# Patient Record
Sex: FEMALE | Race: WHITE | Employment: FULL TIME | URBAN - METROPOLITAN AREA
[De-identification: names, ages, dates, MRNs, and addresses within clinical notes are randomized per-mention and may not be internally consistent; named-entity substitution may affect disease eponyms.]

---

## 2017-03-29 ENCOUNTER — TRANSFERRED RECORDS (OUTPATIENT)
Dept: HEALTH INFORMATION MANAGEMENT | Facility: CLINIC | Age: 37
End: 2017-03-29

## 2017-03-29 LAB
HPV ABSTRACT: NORMAL
PAP-ABSTRACT: NORMAL

## 2018-09-25 ENCOUNTER — OFFICE VISIT (OUTPATIENT)
Dept: PEDIATRICS | Facility: CLINIC | Age: 38
End: 2018-09-25
Payer: COMMERCIAL

## 2018-09-25 VITALS
WEIGHT: 199.5 LBS | HEART RATE: 78 BPM | SYSTOLIC BLOOD PRESSURE: 102 MMHG | OXYGEN SATURATION: 98 % | TEMPERATURE: 97.4 F | HEIGHT: 64 IN | BODY MASS INDEX: 34.06 KG/M2 | DIASTOLIC BLOOD PRESSURE: 60 MMHG

## 2018-09-25 DIAGNOSIS — E03.9 HYPOTHYROIDISM, UNSPECIFIED TYPE: ICD-10-CM

## 2018-09-25 DIAGNOSIS — F32.1 MODERATE MAJOR DEPRESSION (H): ICD-10-CM

## 2018-09-25 DIAGNOSIS — K21.9 GASTROESOPHAGEAL REFLUX DISEASE, ESOPHAGITIS PRESENCE NOT SPECIFIED: ICD-10-CM

## 2018-09-25 DIAGNOSIS — Z23 NEED FOR PROPHYLACTIC VACCINATION AND INOCULATION AGAINST INFLUENZA: Primary | ICD-10-CM

## 2018-09-25 DIAGNOSIS — J30.2 CHRONIC SEASONAL ALLERGIC RHINITIS, UNSPECIFIED TRIGGER: ICD-10-CM

## 2018-09-25 DIAGNOSIS — J45.40 MODERATE PERSISTENT ASTHMA, UNSPECIFIED WHETHER COMPLICATED: ICD-10-CM

## 2018-09-25 DIAGNOSIS — F41.9 ANXIETY: ICD-10-CM

## 2018-09-25 DIAGNOSIS — Z86.32 HISTORY OF GESTATIONAL DIABETES: ICD-10-CM

## 2018-09-25 PROCEDURE — 90471 IMMUNIZATION ADMIN: CPT | Performed by: INTERNAL MEDICINE

## 2018-09-25 PROCEDURE — 90686 IIV4 VACC NO PRSV 0.5 ML IM: CPT | Performed by: INTERNAL MEDICINE

## 2018-09-25 PROCEDURE — 99204 OFFICE O/P NEW MOD 45 MIN: CPT | Mod: 25 | Performed by: INTERNAL MEDICINE

## 2018-09-25 RX ORDER — FLUTICASONE PROPIONATE 50 MCG
SPRAY, SUSPENSION (ML) NASAL
Qty: 1 BOTTLE | Refills: 11 | Status: SHIPPED | OUTPATIENT
Start: 2018-09-25

## 2018-09-25 RX ORDER — HYDROXYZINE PAMOATE 25 MG/1
CAPSULE ORAL
COMMUNITY
Start: 2018-03-14 | End: 2018-09-25

## 2018-09-25 RX ORDER — ALBUTEROL SULFATE 90 UG/1
2 AEROSOL, METERED RESPIRATORY (INHALATION) EVERY 6 HOURS PRN
Qty: 8.5 G | Refills: 11 | Status: SHIPPED | OUTPATIENT
Start: 2018-09-25 | End: 2020-04-20

## 2018-09-25 RX ORDER — CETIRIZINE HYDROCHLORIDE 10 MG/1
10 TABLET ORAL
COMMUNITY
Start: 2014-03-17

## 2018-09-25 RX ORDER — MONTELUKAST SODIUM 10 MG/1
10 TABLET ORAL AT BEDTIME
Qty: 90 TABLET | Refills: 3 | Status: SHIPPED | OUTPATIENT
Start: 2018-09-25 | End: 2019-11-06

## 2018-09-25 RX ORDER — LEVOTHYROXINE SODIUM 50 UG/1
TABLET ORAL
Qty: 120 TABLET | Refills: 3 | Status: SHIPPED | OUTPATIENT
Start: 2018-09-25 | End: 2020-02-19

## 2018-09-25 RX ORDER — MONTELUKAST SODIUM 10 MG/1
10 TABLET ORAL
COMMUNITY
Start: 2018-08-29 | End: 2018-09-25

## 2018-09-25 RX ORDER — BUDESONIDE AND FORMOTEROL FUMARATE DIHYDRATE 80; 4.5 UG/1; UG/1
2 AEROSOL RESPIRATORY (INHALATION) DAILY
Qty: 6.9 G | Refills: 11 | Status: SHIPPED | OUTPATIENT
Start: 2018-09-25 | End: 2019-06-11

## 2018-09-25 RX ORDER — HYDROXYZINE PAMOATE 25 MG/1
CAPSULE ORAL
Qty: 120 CAPSULE | Refills: 1 | Status: SHIPPED | OUTPATIENT
Start: 2018-09-25 | End: 2019-01-09

## 2018-09-25 RX ORDER — ALBUTEROL SULFATE 90 UG/1
2 AEROSOL, METERED RESPIRATORY (INHALATION)
COMMUNITY
Start: 2018-03-14 | End: 2018-09-25

## 2018-09-25 RX ORDER — LEVOTHYROXINE SODIUM 50 UG/1
TABLET ORAL
COMMUNITY
Start: 2018-08-29 | End: 2018-09-25

## 2018-09-25 RX ORDER — FLUTICASONE PROPIONATE 50 MCG
SPRAY, SUSPENSION (ML) NASAL
COMMUNITY
Start: 2018-03-14 | End: 2018-09-25

## 2018-09-25 RX ORDER — BUDESONIDE AND FORMOTEROL FUMARATE DIHYDRATE 80; 4.5 UG/1; UG/1
1 AEROSOL RESPIRATORY (INHALATION)
COMMUNITY
Start: 2018-03-14 | End: 2018-09-25

## 2018-09-25 ASSESSMENT — ANXIETY QUESTIONNAIRES
3. WORRYING TOO MUCH ABOUT DIFFERENT THINGS: SEVERAL DAYS
GAD7 TOTAL SCORE: 6
1. FEELING NERVOUS, ANXIOUS, OR ON EDGE: MORE THAN HALF THE DAYS
6. BECOMING EASILY ANNOYED OR IRRITABLE: NOT AT ALL
IF YOU CHECKED OFF ANY PROBLEMS ON THIS QUESTIONNAIRE, HOW DIFFICULT HAVE THESE PROBLEMS MADE IT FOR YOU TO DO YOUR WORK, TAKE CARE OF THINGS AT HOME, OR GET ALONG WITH OTHER PEOPLE: SOMEWHAT DIFFICULT
7. FEELING AFRAID AS IF SOMETHING AWFUL MIGHT HAPPEN: NOT AT ALL
5. BEING SO RESTLESS THAT IT IS HARD TO SIT STILL: SEVERAL DAYS
2. NOT BEING ABLE TO STOP OR CONTROL WORRYING: SEVERAL DAYS

## 2018-09-25 ASSESSMENT — PATIENT HEALTH QUESTIONNAIRE - PHQ9: 5. POOR APPETITE OR OVEREATING: SEVERAL DAYS

## 2018-09-25 NOTE — PROGRESS NOTES
SUBJECTIVE:   Carley Hoyt is a 38 year old female who presents to clinic today for the following health issues:    Pt is here today to establish care with Dr. Ospina    1. Allergies: Has a number of food, environmental allergies. Also allergic to pet dander. Takes Singulair for allergies and asthma. Also takes cetirizine.     2. Asthma: Uses daily inhaler for this, typically uses this 1 puff once daily. Uses albuterol inhaler prn. Reasonably well controlled at this time.     3. Hypothyroidism: Managed on levothyroxine, takes 50mcg during weekdays and 100mcg Sat and Sun. Last labs in April.     4. GERD: Takes omeprazole as needed. Previously was weaning off, now on a more scheduled basis.    5. Anxiety/depression: On sertraline, reasonably well controlled. Always feels tired. Takes before bed. Occasionally uses hydroxyzine during large gatherings.     Last pap 2016    Problem list and histories reviewed & adjusted, as indicated.  Additional history: as documented    Patient Active Problem List   Diagnosis     History of gestational diabetes     Hypothyroidism, unspecified type     Moderate persistent asthma, unspecified whether complicated     Chronic seasonal allergic rhinitis, unspecified trigger     Gastroesophageal reflux disease, esophagitis presence not specified     Moderate major depression (H)     Anxiety     Past Surgical History:   Procedure Laterality Date     APPENDECTOMY       CARPAL TUNNEL RELEASE RT/LT Right 2016      SECTION       wisdom teeth         Social History   Substance Use Topics     Smoking status: Former Smoker     Smokeless tobacco: Never Used     Alcohol use Yes      Comment: ocassionally      Family History   Problem Relation Age of Onset     Diabetes Father      Glaucoma Father      Diabetes Maternal Grandmother      Asthma Mother      Breast Cancer Mother 60           Reviewed and updated as needed this visit by clinical staff  Tobacco  Allergies  Meds  Med Hx   "Surg Hx  Fam Hx  Soc Hx      Reviewed and updated as needed this visit by Provider  Tobacco  Med Hx  Surg Hx  Fam Hx  Soc Hx        ROS:  Ten point review of systems performed and all others negative unless noted above.    OBJECTIVE:     /60 (BP Location: Right arm, Patient Position: Chair, Cuff Size: Adult Large)  Pulse 78  Temp 97.4  F (36.3  C) (Tympanic)  Ht 5' 4\" (1.626 m)  Wt 199 lb 8 oz (90.5 kg)  LMP 08/31/2018 (Exact Date)  SpO2 98%  Breastfeeding? No  BMI 34.24 kg/m2  Body mass index is 34.24 kg/(m^2).  GENERAL: healthy, alert and no distress  EYES: Eyes grossly normal to inspection, PERRL and conjunctivae and sclerae normal  HENT: ear canals and TM's normal, nose and mouth without ulcers or lesions  NECK: no adenopathy, no asymmetry, masses, or scars and thyroid normal to palpation  RESP: lungs clear to auscultation - no rales, rhonchi or wheezes  CV: regular rate and rhythm, normal S1 S2, no S3 or S4, no murmur, click or rub, no peripheral edema and peripheral pulses strong  MS: no gross musculoskeletal defects noted, no edema  SKIN: no suspicious lesions or rashes  NEURO: Normal strength and tone, mentation intact and speech normal      ASSESSMENT/PLAN:     1. Moderate persistent asthma, unspecified whether complicated  Well controlled per patient report, medications refilled.   - albuterol (PROAIR HFA/PROVENTIL HFA/VENTOLIN HFA) 108 (90 Base) MCG/ACT inhaler; Inhale 2 puffs into the lungs every 6 hours as needed for shortness of breath / dyspnea or wheezing  Dispense: 8.5 g; Refill: 11  - budesonide-formoterol (SYMBICORT) 80-4.5 MCG/ACT Inhaler; Inhale 2 puffs into the lungs daily  Dispense: 6.9 g; Refill: 11  - montelukast (SINGULAIR) 10 MG tablet; Take 1 tablet (10 mg) by mouth At Bedtime  Dispense: 90 tablet; Refill: 3    2. Chronic seasonal allergic rhinitis, unspecified trigger  Stable, medications refilled.   - fluticasone (FLONASE) 50 MCG/ACT spray; INSTILL 2 SPRAYS INTO BOTH " NOSTRILS ONCE A DAY, SHAKE WELL BEFORE USE  Dispense: 1 Bottle; Refill: 11  - montelukast (SINGULAIR) 10 MG tablet; Take 1 tablet (10 mg) by mouth At Bedtime  Dispense: 90 tablet; Refill: 3  - Olopatadine HCl (PAZEO) 0.7 % ophthalmic solution; Apply 1 drop to eye daily  Dispense: 2.5 mL; Refill: 3    3. Moderate major depression (H)  Symptoms relatively stable. Recently moved and has been under fair amount of stress with move, kids and starting new job. Medication refilled.   - sertraline (ZOLOFT) 50 MG tablet; Take 1 tablet (50 mg) by mouth daily  Dispense: 90 tablet; Refill: 3    4. Anxiety  Stable, medication refilled.   - hydrOXYzine (VISTARIL) 25 MG capsule; TAKE 1-2 CAPSULES AS NEEDED EVERY 6 HOURS FOR ANXIETY.  Dispense: 120 capsule; Refill: 1  - sertraline (ZOLOFT) 50 MG tablet; Take 1 tablet (50 mg) by mouth daily  Dispense: 90 tablet; Refill: 3    5. Hypothyroidism, unspecified type  Most recent labs wnl in April. Medication refilled.   - levothyroxine (SYNTHROID/LEVOTHROID) 50 MCG tablet; Take 2 tab on Sat and Sun and 1 tab on Monday through Friday.  Dispense: 120 tablet; Refill: 3    6. Gastroesophageal reflux disease, esophagitis presence not specified  Uses PPI prn.   - omeprazole (PRILOSEC) 20 MG CR capsule; Take 1 capsule (20 mg) by mouth daily  Dispense: 90 capsule; Refill: 3    7. History of gestational diabetes    8. Need for prophylactic vaccination and inoculation against influenza  - FLU VACCINE, SPLIT VIRUS, IM (QUADRIVALENT) [06955]- >3 YRS  - Vaccine Administration, Initial [24650]    Patient Instructions   Medications all on file for next year.    Come in annually for physical, shoot for April-ish.     Flu shot today.       Mary Kay Patino MD  [facility]    Injectable Influenza Immunization Documentation    1.  Is the person to be vaccinated sick today?   No    2. Does the person to be vaccinated have an allergy to a component   of the vaccine?   No  Egg Allergy Algorithm  Link    3. Has the person to be vaccinated ever had a serious reaction   to influenza vaccine in the past?   No    4. Has the person to be vaccinated ever had Guillain-Barré syndrome?   No    Form completed by Latonya Ferrer MA 9/25/2018 1:16 PM

## 2018-09-25 NOTE — MR AVS SNAPSHOT
"              After Visit Summary   9/25/2018    Carley Hoyt    MRN: [id number]           Patient Information     Date Of Birth          1980        Visit Information        Provider Department      9/25/2018 1:10 PM Mary Kay Ospina MD [facility]        Today's Diagnoses     Need for prophylactic vaccination and inoculation against influenza    -  1    Hypothyroidism, unspecified type        Moderate persistent asthma, unspecified whether complicated        Chronic seasonal allergic rhinitis, unspecified trigger        Gastroesophageal reflux disease, esophagitis presence not specified        Moderate major depression (H)        Anxiety        History of gestational diabetes          Care Instructions    Medications all on file for next year.    Come in annually for physical, shoot for April-ish.     Flu shot today.           Follow-ups after your visit        Who to contact     If you have questions or need follow up information about today's clinic visit or your schedule please contact [facility] directly at [phone].  Normal or non-critical lab and imaging results will be communicated to you by MyChart, letter or phone within 4 business days after the clinic has received the results. If you do not hear from us within 7 days, please contact the clinic through MyChart or phone. If you have a critical or abnormal lab result, we will notify you by phone as soon as possible.  Submit refill requests through Soonr or call your pharmacy and they will forward the refill request to us. Please allow 3 business days for your refill to be completed.          Additional Information About Your Visit        MyChart Information     Soonr lets you send messages to your doctor, view your test results, renew your prescriptions, schedule appointments and more. To sign up, go to [email or web address] . Click on \"Log in\" on the left side of the screen, which will take you to the Welcome page. " "Then click on \"Sign up Now\" on the right side of the page.     You will be asked to enter the access code listed below, as well as some personal information. Please follow the directions to create your username and password.     Your access code is: [email or web address]  Expires: 2018  1:45 PM     Your access code will  in 90 days. If you need help or a new code, please call your [facility] clinic or [phone].        Care EveryWhere ID     This is your Care EveryWhere ID. This could be used by other organizations to access your [facility] medical records  [id number]        Your Vitals Were     Pulse Temperature Height Last Period Pulse Oximetry Breastfeeding?    78 97.4  F (36.3  C) (Tympanic) 5' 4\" (1.626 m) 2018 (Exact Date) 98% No    BMI (Body Mass Index)                   34.24 kg/m2            Blood Pressure from Last 3 Encounters:   18 102/60    Weight from Last 3 Encounters:   18 199 lb 8 oz (90.5 kg)              We Performed the Following     FLU VACCINE, SPLIT VIRUS, IM (QUADRIVALENT) [56554]- >3 YRS     Vaccine Administration, Initial [63731]          Today's Medication Changes          These changes are accurate as of 18  1:45 PM.  If you have any questions, ask your nurse or doctor.               These medicines have changed or have updated prescriptions.        Dose/Directions    albuterol 108 (90 Base) MCG/ACT inhaler   Commonly known as:  PROAIR HFA/PROVENTIL HFA/VENTOLIN HFA   This may have changed:    - when to take this  - reasons to take this   Used for:  Moderate persistent asthma, unspecified whether complicated   Changed by:  Mary Kay Ospina MD        Dose:  2 puff   Inhale 2 puffs into the lungs every 6 hours as needed for shortness of breath / dyspnea or wheezing   Quantity:  8.5 g   Refills:  11       budesonide-formoterol 80-4.5 MCG/ACT Inhaler   Commonly known as:  SYMBICORT   This may have changed:    - how much to take  - when to take this   Used for:  " Moderate persistent asthma, unspecified whether complicated   Changed by:  Mary Kay Ospina MD        Dose:  2 puff   Inhale 2 puffs into the lungs daily   Quantity:  6.9 g   Refills:  11       montelukast 10 MG tablet   Commonly known as:  SINGULAIR   This may have changed:  when to take this   Used for:  Moderate persistent asthma, unspecified whether complicated, Chronic seasonal allergic rhinitis, unspecified trigger   Changed by:  Mary Kay Ospina MD        Dose:  10 mg   Take 1 tablet (10 mg) by mouth At Bedtime   Quantity:  90 tablet   Refills:  3       Olopatadine HCl 0.7 % ophthalmic solution   Commonly known as:  PAZEO   This may have changed:    - how much to take  - how to take this  - when to take this   Used for:  Chronic seasonal allergic rhinitis, unspecified trigger   Changed by:  Mary Kay Ospina MD        Dose:  1 drop   Apply 1 drop to eye daily   Quantity:  2.5 mL   Refills:  3       omeprazole 20 MG CR capsule   Commonly known as:  priLOSEC   This may have changed:  when to take this   Used for:  Gastroesophageal reflux disease, esophagitis presence not specified   Changed by:  Mary Kay Ospina MD        Dose:  20 mg   Take 1 capsule (20 mg) by mouth daily   Quantity:  90 capsule   Refills:  3       sertraline 50 MG tablet   Commonly known as:  ZOLOFT   This may have changed:  when to take this   Used for:  Moderate major depression (H), Anxiety   Changed by:  Mary Kay Ospina MD        Dose:  50 mg   Take 1 tablet (50 mg) by mouth daily   Quantity:  90 tablet   Refills:  3            Where to get your medicines      These medications were sent to [facility] [id number]  [place] - [address]]  AT [address] [place] [address]     Phone:  [phone]     albuterol 108 (90 Base) MCG/ACT inhaler    budesonide-formoterol 80-4.5 MCG/ACT Inhaler    fluticasone 50 MCG/ACT spray    hydrOXYzine 25 MG capsule    levothyroxine 50 MCG tablet     montelukast 10 MG tablet    Olopatadine HCl 0.7 % ophthalmic solution    omeprazole 20 MG CR capsule    sertraline 50 MG tablet                Primary Care Provider Office Phone # Fax #    Mary Kay Ospina [phone] [address] [place] [address]        Equal Access to Services     [facility]: Hadii aad ku hadasho Soomaali, waaxda luqadaha, qaybta kaalmada adeegyada, waxay diandrain hayaan adelawrence indiraromulo dc . So [facility] [phone].    ATENCIÓN: Si habla español, tiene a perry disposición servicios gratuitos de asistencia lingüística. [facility] [phone].    We comply with applicable federal civil rights laws and [place] laws. We do not discriminate on the basis of race, color, national origin, age, disability, sex, sexual orientation, or gender identity.            Thank you!     Thank you for choosing [facility] your [facility]. Our goal is always to provide you with excellent care. Hearing back from our patients is one way we can continue to improve our services. Please take a few minutes to complete the written survey that you may receive in the mail after your visit with us. Thank you!             Your Updated Medication List - Protect others around you: Learn how to safely use, store and throw away your medicines at www.disposemymeds.org.          This list is accurate as of 9/25/18  1:45 PM.  Always use your most recent med list.                   Brand Name Dispense Instructions for use Diagnosis    albuterol 108 (90 Base) MCG/ACT inhaler    PROAIR HFA/PROVENTIL HFA/VENTOLIN HFA    8.5 g    Inhale 2 puffs into the lungs every 6 hours as needed for shortness of breath / dyspnea or wheezing    Moderate persistent asthma, unspecified whether complicated       budesonide-formoterol 80-4.5 MCG/ACT Inhaler    SYMBICORT    6.9 g    Inhale 2 puffs into the lungs daily    Moderate persistent asthma, unspecified whether complicated       cetirizine 10 MG tablet     zyrTEC     Take 10 mg by mouth        fluticasone 50 MCG/ACT spray    FLONASE    1 Bottle    INSTILL 2 SPRAYS INTO BOTH NOSTRILS ONCE A DAY, SHAKE WELL BEFORE USE    Chronic seasonal allergic rhinitis, unspecified trigger       hydrOXYzine 25 MG capsule    VISTARIL    120 capsule    TAKE 1-2 CAPSULES AS NEEDED EVERY 6 HOURS FOR ANXIETY.    Anxiety       levothyroxine 50 MCG tablet    SYNTHROID/LEVOTHROID    120 tablet    Take 2 tab on Sat and Sun and 1 tab on Monday through Friday.    Hypothyroidism, unspecified type       montelukast 10 MG tablet    SINGULAIR    90 tablet    Take 1 tablet (10 mg) by mouth At Bedtime    Moderate persistent asthma, unspecified whether complicated, Chronic seasonal allergic rhinitis, unspecified trigger       Olopatadine HCl 0.7 % ophthalmic solution    PAZEO    2.5 mL    Apply 1 drop to eye daily    Chronic seasonal allergic rhinitis, unspecified trigger       omeprazole 20 MG CR capsule    priLOSEC    90 capsule    Take 1 capsule (20 mg) by mouth daily    Gastroesophageal reflux disease, esophagitis presence not specified       sertraline 50 MG tablet    ZOLOFT    90 tablet    Take 1 tablet (50 mg) by mouth daily    Moderate major depression (H), Anxiety

## 2018-09-26 ENCOUNTER — HEALTH MAINTENANCE LETTER (OUTPATIENT)
Age: 38
End: 2018-09-26

## 2018-09-26 ASSESSMENT — ANXIETY QUESTIONNAIRES: GAD7 TOTAL SCORE: 6

## 2018-09-26 ASSESSMENT — PATIENT HEALTH QUESTIONNAIRE - PHQ9: SUM OF ALL RESPONSES TO PHQ QUESTIONS 1-9: 10

## 2018-09-26 ASSESSMENT — ASTHMA QUESTIONNAIRES: ACT_TOTALSCORE: 22

## 2018-09-30 ENCOUNTER — OFFICE VISIT (OUTPATIENT)
Dept: URGENT CARE | Facility: URGENT CARE | Age: 38
End: 2018-09-30
Payer: COMMERCIAL

## 2018-09-30 VITALS
TEMPERATURE: 99.1 F | DIASTOLIC BLOOD PRESSURE: 60 MMHG | SYSTOLIC BLOOD PRESSURE: 90 MMHG | RESPIRATION RATE: 16 BRPM | HEART RATE: 85 BPM | OXYGEN SATURATION: 96 %

## 2018-09-30 DIAGNOSIS — J02.0 ACUTE STREPTOCOCCAL PHARYNGITIS: Primary | ICD-10-CM

## 2018-09-30 DIAGNOSIS — R07.0 THROAT PAIN: ICD-10-CM

## 2018-09-30 LAB
DEPRECATED S PYO AG THROAT QL EIA: ABNORMAL
SPECIMEN SOURCE: ABNORMAL

## 2018-09-30 PROCEDURE — 99213 OFFICE O/P EST LOW 20 MIN: CPT | Performed by: FAMILY MEDICINE

## 2018-09-30 PROCEDURE — 87880 STREP A ASSAY W/OPTIC: CPT | Performed by: FAMILY MEDICINE

## 2018-09-30 RX ORDER — PENICILLIN V POTASSIUM 500 MG/1
500 TABLET, FILM COATED ORAL 3 TIMES DAILY
Qty: 30 TABLET | Refills: 0 | Status: SHIPPED | OUTPATIENT
Start: 2018-09-30 | End: 2018-10-10

## 2018-09-30 NOTE — PROGRESS NOTES
SUBJECTIVE:  Carley Hoyt is a 38 year old female with a chief complaint of sore throat, plugged ears.  Onset of symptoms was one day ago. .   Course of illness: worsening..  Severity severe sore throat.    Current and Associated symptoms: patient had a temp of 100.5 F.    Treatment measures tried include Ibuprofen.  Predisposing factors include none. .    Past medical history:    Environmental allergies    Current Outpatient Prescriptions   Medication Sig Dispense Refill     albuterol (PROAIR HFA/PROVENTIL HFA/VENTOLIN HFA) 108 (90 Base) MCG/ACT inhaler Inhale 2 puffs into the lungs every 6 hours as needed for shortness of breath / dyspnea or wheezing 8.5 g 11     budesonide-formoterol (SYMBICORT) 80-4.5 MCG/ACT Inhaler Inhale 2 puffs into the lungs daily 6.9 g 11     cetirizine (ZYRTEC) 10 MG tablet Take 10 mg by mouth       fluticasone (FLONASE) 50 MCG/ACT spray INSTILL 2 SPRAYS INTO BOTH NOSTRILS ONCE A DAY, SHAKE WELL BEFORE USE 1 Bottle 11     hydrOXYzine (VISTARIL) 25 MG capsule TAKE 1-2 CAPSULES AS NEEDED EVERY 6 HOURS FOR ANXIETY. 120 capsule 1     levothyroxine (SYNTHROID/LEVOTHROID) 50 MCG tablet Take 2 tab on Sat and Sun and 1 tab on Monday through Friday. 120 tablet 3     montelukast (SINGULAIR) 10 MG tablet Take 1 tablet (10 mg) by mouth At Bedtime 90 tablet 3     Olopatadine HCl (PAZEO) 0.7 % ophthalmic solution Apply 1 drop to eye daily 2.5 mL 3     omeprazole (PRILOSEC) 20 MG CR capsule Take 1 capsule (20 mg) by mouth daily 90 capsule 3     sertraline (ZOLOFT) 50 MG tablet Take 1 tablet (50 mg) by mouth daily 90 tablet 3     Social History   Substance Use Topics     Smoking status: Former Smoker     Smokeless tobacco: Never Used     Alcohol use Yes      Comment: ocassionally        ROS:  Review of systems negative except as stated above.    OBJECTIVE:   BP 90/60  Pulse 85  Temp 99.1  F (37.3  C) (Tympanic)  Resp 16  LMP 08/31/2018 (Exact Date)  SpO2 96%  GENERAL APPEARANCE: healthy, alert  and no distress  HENT: ear canals and TM's normal.    Pharynx erythematous with no exudate noted.  NECK: no adenopathy and there is tenderness over the submandibular regions.   RESP: lungs clear to auscultation - no rales, rhonchi or wheezes  CV: regular rates and rhythm, normal S1 S2, no murmur noted  SKIN: no suspicious lesions or rashes    Rapid Strep test is positive    ASSESSMENT:   Throat pain    Strep pharyngitis    PLAN:   Rx:  Penicillin VK and Viscous Lidocaine  See orders in epic.   Symptomatic treat with gargles, lozenges, and OTC analgesic as needed. Follow-up with primary clinic if not improving in 10 days. .    Adolfo Garcia MD

## 2018-09-30 NOTE — PATIENT INSTRUCTIONS
Ice-cold water    Warm saltwater gargles    Ibuprofen, tylenol for the throat pain.     follow up with the primary care provider if not better in 10 days.

## 2018-09-30 NOTE — MR AVS SNAPSHOT
"              After Visit Summary   9/30/2018    Carley Hoyt    MRN: [id number]           Patient Information     Date Of Birth          1980        Visit Information        Provider Department      9/30/2018 5:25 PM Adolfo Garcia MD [facility]        Today's Diagnoses     Acute streptococcal pharyngitis    -  1    Throat pain          Care Instructions    Ice-cold water    Warm saltwater gargles    Ibuprofen, tylenol for the throat pain.     follow up with the primary care provider if not better in 10 days.           Follow-ups after your visit        Who to contact     If you have questions or need follow up information about today's clinic visit or your schedule please contact [facility] directly at [phone].  Normal or non-critical lab and imaging results will be communicated to you by MyChart, letter or phone within 4 business days after the clinic has received the results. If you do not hear from us within 7 days, please contact the clinic through [employer] or phone. If you have a critical or abnormal lab result, we will notify you by phone as soon as possible.  Submit refill requests through [facility] or call your pharmacy and they will forward the refill request to us. Please allow 3 business days for your refill to be completed.          Additional Information About Your Visit        MyChart Information     [facility] lets you send messages to your doctor, view your test results, renew your prescriptions, schedule appointments and more. To sign up, go to [email or web address] . Click on \"Log in\" on the left side of the screen, which will take you to the Welcome page. Then click on \"Sign up Now\" on the right side of the page.     You will be asked to enter the access code listed below, as well as some personal information. Please follow the directions to create your username and password.     Your access code is: [email or web address]  Expires: 12/24/2018  1:45 PM     Your access " code will  in 90 days. If you need help or a new code, please call your [place] clinic or [phone].        Care EveryWhere ID     This is your Care EveryWhere ID. This could be used by other organizations to access your [place] medical records  [id number]        Your Vitals Were     Pulse Temperature Respirations Last Period Pulse Oximetry       85 99.1  F (37.3  C) (Tympanic) 16 2018 (Exact Date) 96%        Blood Pressure from Last 3 Encounters:   18 90/60   18 102/60    Weight from Last 3 Encounters:   18 199 lb 8 oz (90.5 kg)              We Performed the Following     Strep, Rapid Screen          Today's Medication Changes          These changes are accurate as of 18  6:15 PM.  If you have any questions, ask your nurse or doctor.               Start taking these medicines.        Dose/Directions    lidocaine (viscous) 2 % solution   Commonly known as:  XYLOCAINE   Used for:  Throat pain   Started by:  Adolfo Garcia MD        swish and spit 15 mL every 3-8 hours as needed; max 8 doses/24 hour period   Quantity:  100 mL   Refills:  3       penicillin V potassium 500 MG tablet   Commonly known as:  VEETID   Used for:  Acute streptococcal pharyngitis   Started by:  Adolfo Garcia MD        Dose:  500 mg   Take 1 tablet (500 mg) by mouth 3 times daily for 10 days   Quantity:  30 tablet   Refills:  0            Where to get your medicines      Some of these will need a paper prescription and others can be bought over the counter.  Ask your nurse if you have questions.     Bring a paper prescription for each of these medications     lidocaine (viscous) 2 % solution    penicillin V potassium 500 MG tablet                Primary Care Provider Office Phone # Fax #    Mary Kay Ospina [phone] [address] [place] [address]        Equal Access to Services     [place] AH: jae Hayden, angelica esposito  varsha aguileralawrence garcíaaan ah. Lila [facility] [phone].    ATENCIÓN: Si habla manohar, tiene a perry disposición servicios gratuitos de asistencia lingüística. Zahida al [phone].    We comply with applicable federal civil rights laws and [place] laws. We do not discriminate on the basis of race, color, national origin, age, disability, sex, sexual orientation, or gender identity.            Thank you!     Thank you for choosing [facility]  for your care. Our goal is always to provide you with excellent care. Hearing back from our patients is one way we can continue to improve our services. Please take a few minutes to complete the written survey that you may receive in the mail after your visit with us. Thank you!             Your Updated Medication List - Protect others around you: Learn how to safely use, store and throw away your medicines at www.disposemymeds.org.          This list is accurate as of 9/30/18  6:15 PM.  Always use your most recent med list.                   Brand Name Dispense Instructions for use Diagnosis    albuterol 108 (90 Base) MCG/ACT inhaler    PROAIR HFA/PROVENTIL HFA/VENTOLIN HFA    8.5 g    Inhale 2 puffs into the lungs every 6 hours as needed for shortness of breath / dyspnea or wheezing    Moderate persistent asthma, unspecified whether complicated       budesonide-formoterol 80-4.5 MCG/ACT Inhaler    SYMBICORT    6.9 g    Inhale 2 puffs into the lungs daily    Moderate persistent asthma, unspecified whether complicated       cetirizine 10 MG tablet    zyrTEC     Take 10 mg by mouth        fluticasone 50 MCG/ACT spray    FLONASE    1 Bottle    INSTILL 2 SPRAYS INTO BOTH NOSTRILS ONCE A DAY, SHAKE WELL BEFORE USE    Chronic seasonal allergic rhinitis, unspecified trigger       hydrOXYzine 25 MG capsule    VISTARIL    120 capsule    TAKE 1-2 CAPSULES AS NEEDED EVERY 6 HOURS FOR ANXIETY.    Anxiety       levothyroxine 50 MCG tablet     SYNTHROID/LEVOTHROID    120 tablet    Take 2 tab on Sat and Sun and 1 tab on Monday through Friday.    Hypothyroidism, unspecified type       lidocaine (viscous) 2 % solution    XYLOCAINE    100 mL    swish and spit 15 mL every 3-8 hours as needed; max 8 doses/24 hour period    Throat pain       montelukast 10 MG tablet    SINGULAIR    90 tablet    Take 1 tablet (10 mg) by mouth At Bedtime    Moderate persistent asthma, unspecified whether complicated, Chronic seasonal allergic rhinitis, unspecified trigger       Olopatadine HCl 0.7 % ophthalmic solution    PAZEO    2.5 mL    Apply 1 drop to eye daily    Chronic seasonal allergic rhinitis, unspecified trigger       omeprazole 20 MG CR capsule    priLOSEC    90 capsule    Take 1 capsule (20 mg) by mouth daily    Gastroesophageal reflux disease, esophagitis presence not specified       penicillin V potassium 500 MG tablet    VEETID    30 tablet    Take 1 tablet (500 mg) by mouth 3 times daily for 10 days    Acute streptococcal pharyngitis       sertraline 50 MG tablet    ZOLOFT    90 tablet    Take 1 tablet (50 mg) by mouth daily    Moderate major depression (H), Anxiety

## 2018-10-03 ENCOUNTER — MYC MEDICAL ADVICE (OUTPATIENT)
Dept: PEDIATRICS | Facility: CLINIC | Age: 38
End: 2018-10-03

## 2018-10-03 ENCOUNTER — E-VISIT (OUTPATIENT)
Dept: PEDIATRICS | Facility: CLINIC | Age: 38
End: 2018-10-03
Payer: COMMERCIAL

## 2018-10-03 DIAGNOSIS — Z53.9 ERRONEOUS ENCOUNTER--DISREGARD: Primary | ICD-10-CM

## 2018-10-03 NOTE — TELEPHONE ENCOUNTER
Please see mom's MC message.     Latonya already called mom to help schedule an appointment for her kids(Dwight, 6 yrs & Kirstie, 4 yrs) to be seen today.    Mariluz, RN  Triage Nurse

## 2018-10-03 NOTE — MR AVS SNAPSHOT
After Visit Summary   10/3/2018    Carley Hoyt    MRN: [id number]           Patient Information     Date Of Birth          1980        Visit Information        Provider Department      10/3/2018 8:10 AM Mary Kay Ospina MD [facility]        Today's Diagnoses     ERRONEOUS ENCOUNTER--DISREGARD    -  1       Follow-ups after your visit        Who to contact     If you have questions or need follow up information about today's clinic visit or your schedule please contact [facility] directly at [phone].  Normal or non-critical lab and imaging results will be communicated to you by [facility], letter or phone within 4 business days after the clinic has received the results. If you do not hear from us within 7 days, please contact the clinic through [facility] or phone. If you have a critical or abnormal lab result, we will notify you by phone as soon as possible.  Submit refill requests through [facility] or call your pharmacy and they will forward the refill request to us. Please allow 3 business days for your refill to be completed.          Additional Information About Your Visit        MyChart Information     [facility] [employer] gives you secure access to your electronic health record. If you see a primary care provider, you can also send messages to your care team and make appointments. If you have questions, please call your primary care clinic.  If you do not have a primary care provider, please call [phone] and they will assist you.        Care EveryWhere ID     This is your Care EveryWhere ID. This could be used by other organizations to access your [place] medical records  [id number]         Blood Pressure from Last 3 Encounters:   09/30/18 90/60   09/25/18 102/60    Weight from Last 3 Encounters:   09/25/18 199 lb 8 oz (90.5 kg)              Today, you had the following     No orders found for display       Primary Care Provider Office Phone # Fax #    Mary Kay Ospina MD  [phone]       [address] [place] [address]        Equal Access to Services     EN[place]QUETA GA : Hadii xochitl rizvi jenny Soeva, waaxda luqadaha, qaybta kaalmada ale, varsha diandrain hayaahilda russolawrence lui laAlyshasakina bowser. So [facility] [phone].    ATENCIÓN: Si habla español, tiene a perry disposición servicios gratuitos de asistencia lingüística. Taishaame al [phone].    We comply with applicable federal civil rights laws and [place] laws. We do not discriminate on the basis of race, color, national origin, age, disability, sex, sexual orientation, or gender identity.            Thank you!     Thank you for choosing [facility]  for your care. Our goal is always to provide you with excellent care. Hearing back from our patients is one way we can continue to improve our services. Please take a few minutes to complete the written survey that you may receive in the mail after your visit with us. Thank you!             Your Updated Medication List - Protect others around you: Learn how to safely use, store and throw away your medicines at www.disposemymeds.org.          This list is accurate as of 10/3/18  8:49 AM.  Always use your most recent med list.                   Brand Name Dispense Instructions for use Diagnosis    albuterol 108 (90 Base) MCG/ACT inhaler    PROAIR HFA/PROVENTIL HFA/VENTOLIN HFA    8.5 g    Inhale 2 puffs into the lungs every 6 hours as needed for shortness of breath / dyspnea or wheezing    Moderate persistent asthma, unspecified whether complicated       budesonide-formoterol 80-4.5 MCG/ACT Inhaler    SYMBICORT    6.9 g    Inhale 2 puffs into the lungs daily    Moderate persistent asthma, unspecified whether complicated       cetirizine 10 MG tablet    zyrTEC     Take 10 mg by mouth        fluticasone 50 MCG/ACT spray    FLONASE    1 Bottle    INSTILL 2 SPRAYS INTO BOTH NOSTRILS ONCE A DAY, SHAKE WELL BEFORE USE    Chronic seasonal allergic rhinitis,  unspecified trigger       hydrOXYzine 25 MG capsule    VISTARIL    120 capsule    TAKE 1-2 CAPSULES AS NEEDED EVERY 6 HOURS FOR ANXIETY.    Anxiety       levothyroxine 50 MCG tablet    SYNTHROID/LEVOTHROID    120 tablet    Take 2 tab on Sat and Sun and 1 tab on Monday through Friday.    Hypothyroidism, unspecified type       lidocaine (viscous) 2 % solution    XYLOCAINE    100 mL    swish and spit 15 mL every 3-8 hours as needed; max 8 doses/24 hour period    Throat pain       montelukast 10 MG tablet    SINGULAIR    90 tablet    Take 1 tablet (10 mg) by mouth At Bedtime    Moderate persistent asthma, unspecified whether complicated, Chronic seasonal allergic rhinitis, unspecified trigger       Olopatadine HCl 0.7 % ophthalmic solution    PAZEO    2.5 mL    Apply 1 drop to eye daily    Chronic seasonal allergic rhinitis, unspecified trigger       omeprazole 20 MG CR capsule    priLOSEC    90 capsule    Take 1 capsule (20 mg) by mouth daily    Gastroesophageal reflux disease, esophagitis presence not specified       penicillin V potassium 500 MG tablet    VEETID    30 tablet    Take 1 tablet (500 mg) by mouth 3 times daily for 10 days    Acute streptococcal pharyngitis       sertraline 50 MG tablet    ZOLOFT    90 tablet    Take 1 tablet (50 mg) by mouth daily    Moderate major depression (H), Anxiety

## 2018-10-03 NOTE — TELEPHONE ENCOUNTER
No charge for Evisit, please call to schedule patient's children for strep visit. OK to overbook on my schedule.    Mary Kay Ospina MD  Internal Medicine-Pediatrics

## 2019-01-09 ENCOUNTER — E-VISIT (OUTPATIENT)
Dept: PEDIATRICS | Facility: CLINIC | Age: 39
End: 2019-01-09
Payer: COMMERCIAL

## 2019-01-09 ENCOUNTER — OFFICE VISIT (OUTPATIENT)
Dept: PEDIATRICS | Facility: CLINIC | Age: 39
End: 2019-01-09
Payer: COMMERCIAL

## 2019-01-09 VITALS
HEIGHT: 64 IN | BODY MASS INDEX: 34.06 KG/M2 | OXYGEN SATURATION: 96 % | TEMPERATURE: 98 F | WEIGHT: 199.5 LBS | DIASTOLIC BLOOD PRESSURE: 70 MMHG | SYSTOLIC BLOOD PRESSURE: 90 MMHG | HEART RATE: 89 BPM

## 2019-01-09 DIAGNOSIS — J30.2 SEASONAL ALLERGIC RHINITIS, UNSPECIFIED TRIGGER: ICD-10-CM

## 2019-01-09 DIAGNOSIS — Z53.9 ERRONEOUS ENCOUNTER--DISREGARD: Primary | ICD-10-CM

## 2019-01-09 DIAGNOSIS — R07.0 THROAT PAIN: Primary | ICD-10-CM

## 2019-01-09 LAB
DEPRECATED S PYO AG THROAT QL EIA: NORMAL
SPECIMEN SOURCE: NORMAL

## 2019-01-09 PROCEDURE — 99213 OFFICE O/P EST LOW 20 MIN: CPT | Performed by: PHYSICIAN ASSISTANT

## 2019-01-09 PROCEDURE — 87880 STREP A ASSAY W/OPTIC: CPT | Performed by: PHYSICIAN ASSISTANT

## 2019-01-09 PROCEDURE — 87081 CULTURE SCREEN ONLY: CPT | Performed by: PHYSICIAN ASSISTANT

## 2019-01-09 ASSESSMENT — ENCOUNTER SYMPTOMS
HEADACHES: 0
SORE THROAT: 1
VOMITING: 0
SHORTNESS OF BREATH: 0
COUGH: 1
FEVER: 0
NAUSEA: 0
FOCAL WEAKNESS: 0
CHILLS: 0
ABDOMINAL PAIN: 0
DIARRHEA: 0

## 2019-01-09 ASSESSMENT — MIFFLIN-ST. JEOR: SCORE: 1574.93

## 2019-01-09 NOTE — TELEPHONE ENCOUNTER
Dr. Ospina sent the follow message: Please help patient to schedule an appointment . Thanks.   Please help patient schedule either OV with same day provider and cancel Evisit or nurse only appointment for strep test. (Routing comment)       Ana Spears RN Flex

## 2019-01-09 NOTE — PROGRESS NOTES
"  SUBJECTIVE:   Carley Hoyt is a 39 year old female who presents to clinic today for the following health issues:      Acute Illness   Acute illness concerns: possible strep   Onset: last night     Fever: no    Chills/Sweats: chills     Headache (location?): no    Sinus Pressure:YES    Conjunctivitis:  no    Ear Pain: no    Rhinorrhea: YES    Congestion: YES    Sore Throat: YES- feels raw      Cough: YES-non-productive    Wheeze: no    Decreased Appetite: no    Nausea: no    Vomiting: no    Diarrhea:  no    Dysuria/Freq.: no    Fatigue/Achiness: YES- fatigue and some soreness     Sick/Strep Exposure: YES- possible daughter's       Therapies Tried and outcome: none     Problem list and histories reviewed & adjusted, as indicated.  Additional history: {NONE - AS DOCUMENTED:526799::\"as documented\"}    {HIST REVIEW/ LINKS 2:674851}    Reviewed and updated as needed this visit by clinical staff       Reviewed and updated as needed this visit by Provider         {PROVIDER CHARTING PREFERENCE:529122}  "

## 2019-01-09 NOTE — PROGRESS NOTES
HPI    SUBJECTIVE:   Carley Hoyt is a 39 year old female who presents to clinic today for the following health issues:    Acute Illness   Acute illness concerns: possible strep   Onset: last night     Fever: no    Chills/Sweats: chills     Headache (location?): no    Sinus Pressure:YES    Conjunctivitis:  no    Ear Pain: no    Rhinorrhea: YES    Congestion: YES    Sore Throat: YES- feels raw      Cough: YES-non-productive    Wheeze: no    Decreased Appetite: no    Nausea: no    Vomiting: no    Diarrhea:  no    Dysuria/Freq.: no    Fatigue/Achiness: YES- fatigue and some soreness     Sick/Strep Exposure: YES-  daughter's       Therapies Tried and outcome: none     Has allergies and wasn't taking allergy medication- started this again yesterday and congestion, rhinorrhea are better.      Chart Review:  PHQ-9 SCORE 2018   PHQ-9 Total Score 10     LIZBETH-7 SCORE 2018   Total Score 6       Patient Active Problem List   Diagnosis     History of gestational diabetes     Hypothyroidism, unspecified type     Moderate persistent asthma, unspecified whether complicated     Seasonal allergic rhinitis     Gastroesophageal reflux disease, esophagitis presence not specified     Moderate major depression (H)     Anxiety     Past Surgical History:   Procedure Laterality Date     APPENDECTOMY       CARPAL TUNNEL RELEASE RT/LT Right 2016      SECTION       wisdom teeth       Family History   Problem Relation Age of Onset     Diabetes Father      Glaucoma Father      Diabetes Maternal Grandmother      Asthma Mother      Breast Cancer Mother 60      Social History     Tobacco Use     Smoking status: Former Smoker     Smokeless tobacco: Never Used   Substance Use Topics     Alcohol use: Yes     Comment: ocassionally         Problem list, Medication list, Allergies, Medical/Social/Surg hx reviewed in [facility], updated as appropriate.      Review of Systems   Constitutional: Positive for malaise/fatigue. Negative  "for chills and fever.   HENT: Positive for congestion and sore throat.         Rhinorrhea   Respiratory: Positive for cough. Negative for shortness of breath.    Cardiovascular: Negative for chest pain.   Gastrointestinal: Negative for abdominal pain, diarrhea, nausea and vomiting.   Skin: Negative for rash.   Neurological: Negative for focal weakness and headaches.   All other systems reviewed and are negative.        Physical Exam   Constitutional: She is oriented to person, place, and time.   HENT:   Head: Normocephalic and atraumatic.   Right Ear: Tympanic membrane and external ear normal.   Left Ear: Tympanic membrane and external ear normal.   Mouth/Throat: Uvula is midline, oropharynx is clear and moist and mucous membranes are normal.   Cardiovascular: Normal rate, regular rhythm and normal heart sounds.   Pulmonary/Chest: Effort normal and breath sounds normal.   Musculoskeletal: Normal range of motion.   Neurological: She is alert and oriented to person, place, and time.   Skin: Skin is warm and dry.   Nursing note and vitals reviewed.    Vital Signs  BP 90/70   Pulse 89   Temp 98  F (36.7  C) (Oral)   Ht 1.626 m (5' 4\")   Wt 90.5 kg (199 lb 8 oz)   SpO2 96%   BMI 34.24 kg/m     Body mass index is 34.24 kg/m .    Diagnostic Test Results:  Results for orders placed or performed in visit on 01/09/19 (from the past 24 hour(s))   Strep, Rapid Screen   Result Value Ref Range    Specimen Description Throat     Rapid Strep A Screen       NEGATIVE: No Group A streptococcal antigen detected by immunoassay, await culture report.       ASSESSMENT/PLAN:                                                        ICD-10-CM    1. Throat pain R07.0 Strep, Rapid Screen     Beta strep group A culture   2. Seasonal allergic rhinitis, unspecified trigger J30.2    Strep negative. Suspect throat pain due to allergies- continue allergy medication.    I have discussed any lab or imaging results, the patient's diagnosis, and my " plan of treatment with the patient and/or family. Patient is aware to come back in if with worsening symptoms or if no relief despite treatment plan.  Patient voiced understanding and had no further questions.       Follow Up: Return in about 1 week (around 1/16/2019) for Follow up w/ PCP if not better.    ANU Wan, PA-C  [facility]

## 2019-01-11 LAB
BACTERIA SPEC CULT: NORMAL
SPECIMEN SOURCE: NORMAL

## 2019-02-27 ENCOUNTER — OFFICE VISIT (OUTPATIENT)
Dept: URGENT CARE | Facility: URGENT CARE | Age: 39
End: 2019-02-27
Payer: COMMERCIAL

## 2019-02-27 VITALS
HEART RATE: 84 BPM | OXYGEN SATURATION: 97 % | BODY MASS INDEX: 35.36 KG/M2 | DIASTOLIC BLOOD PRESSURE: 64 MMHG | WEIGHT: 206 LBS | TEMPERATURE: 98.3 F | SYSTOLIC BLOOD PRESSURE: 104 MMHG

## 2019-02-27 DIAGNOSIS — R10.13 ABDOMINAL PAIN, EPIGASTRIC: Primary | ICD-10-CM

## 2019-02-27 PROBLEM — E66.01 MORBID OBESITY (H): Status: ACTIVE | Noted: 2019-02-27

## 2019-02-27 LAB
ALBUMIN SERPL-MCNC: 3.6 G/DL (ref 3.4–5)
ALP SERPL-CCNC: 67 U/L (ref 40–150)
ALT SERPL W P-5'-P-CCNC: 21 U/L (ref 0–50)
ANION GAP SERPL CALCULATED.3IONS-SCNC: 11 MMOL/L (ref 3–14)
AST SERPL W P-5'-P-CCNC: 26 U/L (ref 0–45)
BILIRUB SERPL-MCNC: 0.7 MG/DL (ref 0.2–1.3)
BUN SERPL-MCNC: 6 MG/DL (ref 7–30)
CALCIUM SERPL-MCNC: 9.6 MG/DL (ref 8.5–10.1)
CHLORIDE SERPL-SCNC: 108 MMOL/L (ref 94–109)
CO2 SERPL-SCNC: 27 MMOL/L (ref 20–32)
CREAT SERPL-MCNC: 0.8 MG/DL (ref 0.52–1.04)
ERYTHROCYTE [DISTWIDTH] IN BLOOD BY AUTOMATED COUNT: 13.2 % (ref 10–15)
GFR SERPL CREATININE-BSD FRML MDRD: >90 ML/MIN/{1.73_M2}
GLUCOSE SERPL-MCNC: 129 MG/DL (ref 70–99)
HCT VFR BLD AUTO: 36.5 % (ref 35–47)
HGB BLD-MCNC: 12.7 G/DL (ref 11.7–15.7)
MCH RBC QN AUTO: 30.2 PG (ref 26.5–33)
MCHC RBC AUTO-ENTMCNC: 34.8 G/DL (ref 31.5–36.5)
MCV RBC AUTO: 87 FL (ref 78–100)
PLATELET # BLD AUTO: 229 10E9/L (ref 150–450)
POTASSIUM SERPL-SCNC: 3.7 MMOL/L (ref 3.4–5.3)
PROT SERPL-MCNC: 6.9 G/DL (ref 6.8–8.8)
RBC # BLD AUTO: 4.21 10E12/L (ref 3.8–5.2)
SODIUM SERPL-SCNC: 146 MMOL/L (ref 133–144)
WBC # BLD AUTO: 8.5 10E9/L (ref 4–11)

## 2019-02-27 PROCEDURE — 80053 COMPREHEN METABOLIC PANEL: CPT | Performed by: PHYSICIAN ASSISTANT

## 2019-02-27 PROCEDURE — 36415 COLL VENOUS BLD VENIPUNCTURE: CPT | Performed by: PHYSICIAN ASSISTANT

## 2019-02-27 PROCEDURE — 99214 OFFICE O/P EST MOD 30 MIN: CPT | Performed by: PHYSICIAN ASSISTANT

## 2019-02-27 PROCEDURE — 85027 COMPLETE CBC AUTOMATED: CPT | Performed by: PHYSICIAN ASSISTANT

## 2019-02-27 NOTE — LETTER
[facility]  [address]  [address]  [place] [address]  Phone: [phone]  Fax: [phone]    February 27, 2019        Carley Hoyt  [address]   [place] [address]          To whom it may concern:    RE: Carley Hoyt    Patient was seen and treated today at our clinic.  Please excuse absences 2/25 - 2/27/19.     Please contact me for questions or concerns.      Sincerely,        Erickson Servin PA-C

## 2019-02-27 NOTE — PATIENT INSTRUCTIONS
Patient Education     Epigastric Pain (Uncertain Cause)     Epigastric pain is pain in the upper abdomen. It can be a sign of disease. Common causes include:    Acid reflux (stomach acid flowing up into the esophagus)    Gastritis (irritation of the stomach lining) Most often this is from aspirin or NSAID medicines such as ibuprofen, bacteria called H. pylori, or frequent alcohol use.    Peptic ulcer disease    Inflammation of the pancreas    Gallstone    Infection in the gallbladder  Pain may be dull or burning. It may spread upward to the chest or to the back. There may be other symptoms such as belching, bloating, cramps or hunger pains. There may be weight loss or poor appetite, nausea or vomiting.  Since the cause of your pain is not certain yet,you may need more tests. Sometimes the doctor will treat you for the most likely condition to see if there is improvement before doing more tests.  Home care  Medicines    Antacids help neutralize the normal acids in your stomach.If you don t like the liquid, you can try a chewable one. You may find one works better than another for you. Overuse can cause diarrhea or constipation.    Acid blockers (H2 blockers) decrease acid production. Examples are cimetidine, famotidine, and ranitidine.    Acid inhibitors (PPIs) decrease acid production in a different way than the blockers. You may find they work better, but can take a little longer to take effect.  Examples are omeprazole, lansoprazole, pantoprazole, rabeprazole, and esomeprazole. Many of these are available over-the-counter or available as generics.    Take an antacid 30 to 60 minutes after eating and at bedtime, but not at the same time as an acid blocker.    Try not to take NSAIDs such as ibuprofen. Aspirin may also cause problems, but if taking it for your heart or other medical reasons, talk to your doctor before stopping it; you don't want to cause a worse problem, like a heart attack or stroke.  Diet    If  certain foods seem to cause your pain, try not to eat them. Certain foods can worsen symptoms of gastritis. Limit or avoid fatty, fried, and spicy foods, as well as coffee, chocolate, mint, and foods with high acid content such as tomatoes and citrus fruit and juices (orange, grapefruit, lemon).    Eat slowly and chew food well before swallowing. Symptoms of gastritis can be worsened by certain foods.    Don't drink alcohol. It can irritate the stomach.    Don't consume caffeine, or use tobacco. These can delay healing and worsen your problem.    Try eating smaller meals with snacks in between.    Keep an empty stomach for 2 to 3 hours before lying down.    Prop the head of the bed up if you have overnight symptoms. This helps acid clear from your esophagus.  Follow-up care  Follow up with your healthcare provider or as advised.  When to seek medical advice  Call your healthcare provider right away if any of the following occur:    Stomach pain worsens or moves to the right lower part of the abdomen    Chest pain appears, or if it worsens or spreads to the chest, back, neck, shoulder, or arm    Frequent vomiting (can t keep down liquids)    Blood in the stool or vomit (red or black color)    Feeling weak or dizzy, fainting, or having trouble breathing    Fever of 100.4 F (38 C) or higher, or as directed by your healthcare provider    Abdominal swelling  Date Last Reviewed: 3/1/2018    3508-2629 [employer]. [address] [place] [address], [place] [address]. All rights reserved. This information is not intended as a substitute for professional medical care. Always follow your healthcare professional's instructions.

## 2019-02-28 NOTE — PROGRESS NOTES
SUBJECTIVE  HPI:  Carley Hoyt is a 39 year old female who presents with the CC of epigastric pain for 3-4 days. Stopped taking omeprazole a few weeks ago.  Started again 2 days ago with some relief.  Pain is constant with spikes of worsening pain.  Some improvement with food.  Marked increased stress in the last week.  No bloo din stool, urine.  No change in bowel or bladder function.      No past medical history on file.  Current Outpatient Medications   Medication Sig Dispense Refill     budesonide-formoterol (SYMBICORT) 80-4.5 MCG/ACT Inhaler Inhale 2 puffs into the lungs daily 6.9 g 11     cetirizine (ZYRTEC) 10 MG tablet Take 10 mg by mouth       fluticasone (FLONASE) 50 MCG/ACT spray INSTILL 2 SPRAYS INTO BOTH NOSTRILS ONCE A DAY, SHAKE WELL BEFORE USE 1 Bottle 11     levothyroxine (SYNTHROID/LEVOTHROID) 50 MCG tablet Take 2 tab on Sat and Sun and 1 tab on Monday through Friday. 120 tablet 3     montelukast (SINGULAIR) 10 MG tablet Take 1 tablet (10 mg) by mouth At Bedtime 90 tablet 3     omeprazole (PRILOSEC) 20 MG CR capsule Take 1 capsule (20 mg) by mouth daily 90 capsule 3     ranitidine (ZANTAC) 150 MG tablet Take 1 tablet (150 mg) by mouth 2 times daily 60 tablet 0     sertraline (ZOLOFT) 50 MG tablet Take 1 tablet (50 mg) by mouth daily 90 tablet 3     albuterol (PROAIR HFA/PROVENTIL HFA/VENTOLIN HFA) 108 (90 Base) MCG/ACT inhaler Inhale 2 puffs into the lungs every 6 hours as needed for shortness of breath / dyspnea or wheezing (Patient not taking: Reported on 2/27/2019) 8.5 g 11     Olopatadine HCl (PAZEO) 0.7 % ophthalmic solution Apply 1 drop to eye daily (Patient not taking: Reported on 2/27/2019) 2.5 mL 3     Social History     Tobacco Use     Smoking status: Former Smoker     Smokeless tobacco: Never Used   Substance Use Topics     Alcohol use: Yes     Comment: ocassionally        ROS:  Review of systems negative except as stated above.    OBJECTIVE:  /64 (Cuff Size: Adult Regular)    Pulse 84   Temp 98.3  F (36.8  C) (Oral)   Wt 93.4 kg (206 lb)   SpO2 97%   BMI 35.36 kg/m    GENERAL APPEARANCE: healthy, alert and no distress  HENT: ear canals and TM's normal.  Nose and mouth without ulcers, erythema or lesions  NECK: supple, nontender, no lymphadenopathy  RESP: lungs clear to auscultation - no rales, rhonchi or wheezes  CV: regular rates and rhythm, normal S1 S2, no murmur noted  ABDOMEN:  soft, nontender, no HSM or masses and bowel sounds normal  NEURO: Normal strength and tone, sensory exam grossly normal,  normal speech and mentation  SKIN: no suspicious lesions or rashes    Results for orders placed or performed in visit on 02/27/19   Comprehensive metabolic panel (BMP + Alb, Alk Phos, ALT, AST, Total. Bili, TP)   Result Value Ref Range    Sodium 146 (H) 133 - 144 mmol/L    Potassium 3.7 3.4 - 5.3 mmol/L    Chloride 108 94 - 109 mmol/L    Carbon Dioxide 27 20 - 32 mmol/L    Anion Gap 11 3 - 14 mmol/L    Glucose 129 (H) 70 - 99 mg/dL    Urea Nitrogen 6 (L) 7 - 30 mg/dL    Creatinine 0.80 0.52 - 1.04 mg/dL    GFR Estimate >90 >60 mL/min/[1.73_m2]    GFR Estimate If Black >90 >60 mL/min/[1.73_m2]    Calcium 9.6 8.5 - 10.1 mg/dL    Bilirubin Total 0.7 0.2 - 1.3 mg/dL    Albumin 3.6 3.4 - 5.0 g/dL    Protein Total 6.9 6.8 - 8.8 g/dL    Alkaline Phosphatase 67 40 - 150 U/L    ALT 21 0 - 50 U/L    AST 26 0 - 45 U/L   CBC with platelets   Result Value Ref Range    WBC 8.5 4.0 - 11.0 10e9/L    RBC Count 4.21 3.8 - 5.2 10e12/L    Hemoglobin 12.7 11.7 - 15.7 g/dL    Hematocrit 36.5 35.0 - 47.0 %    MCV 87 78 - 100 fl    MCH 30.2 26.5 - 33.0 pg    MCHC 34.8 31.5 - 36.5 g/dL    RDW 13.2 10.0 - 15.0 %    Platelet Count 229 150 - 450 10e9/L         ASSESSMENT:  (R10.13) Abdominal pain, epigastric  (primary encounter diagnosis)  Comment: Does not appear to be acute cholecystitis, obstruction, peptic ulcer.  Symptoms improved with GI Cocktail.  likely gastritis.  Plan: Comprehensive metabolic panel (BMP  + Alb, Alk         Phos, ALT, AST, Total. Bili, TP), CBC with         platelets, ranitidine (ZANTAC) 150 MG tablet  Red flags and emergent follow up discussed, and understood by patient  Follow up with PCP if symptoms worsen or fail to improve  In 4-7 days    Patient Instructions     Patient Education     Epigastric Pain (Uncertain Cause)     Epigastric pain is pain in the upper abdomen. It can be a sign of disease. Common causes include:    Acid reflux (stomach acid flowing up into the esophagus)    Gastritis (irritation of the stomach lining) Most often this is from aspirin or NSAID medicines such as ibuprofen, bacteria called H. pylori, or frequent alcohol use.    Peptic ulcer disease    Inflammation of the pancreas    Gallstone    Infection in the gallbladder  Pain may be dull or burning. It may spread upward to the chest or to the back. There may be other symptoms such as belching, bloating, cramps or hunger pains. There may be weight loss or poor appetite, nausea or vomiting.  Since the cause of your pain is not certain yet,you may need more tests. Sometimes the doctor will treat you for the most likely condition to see if there is improvement before doing more tests.  Home care  Medicines    Antacids help neutralize the normal acids in your stomach.If you don t like the liquid, you can try a chewable one. You may find one works better than another for you. Overuse can cause diarrhea or constipation.    Acid blockers (H2 blockers) decrease acid production. Examples are cimetidine, famotidine, and ranitidine.    Acid inhibitors (PPIs) decrease acid production in a different way than the blockers. You may find they work better, but can take a little longer to take effect.  Examples are omeprazole, lansoprazole, pantoprazole, rabeprazole, and esomeprazole. Many of these are available over-the-counter or available as generics.    Take an antacid 30 to 60 minutes after eating and at bedtime, but not at the same  time as an acid blocker.    Try not to take NSAIDs such as ibuprofen. Aspirin may also cause problems, but if taking it for your heart or other medical reasons, talk to your doctor before stopping it; you don't want to cause a worse problem, like a heart attack or stroke.  Diet    If certain foods seem to cause your pain, try not to eat them. Certain foods can worsen symptoms of gastritis. Limit or avoid fatty, fried, and spicy foods, as well as coffee, chocolate, mint, and foods with high acid content such as tomatoes and citrus fruit and juices (orange, grapefruit, lemon).    Eat slowly and chew food well before swallowing. Symptoms of gastritis can be worsened by certain foods.    Don't drink alcohol. It can irritate the stomach.    Don't consume caffeine, or use tobacco. These can delay healing and worsen your problem.    Try eating smaller meals with snacks in between.    Keep an empty stomach for 2 to 3 hours before lying down.    Prop the head of the bed up if you have overnight symptoms. This helps acid clear from your esophagus.  Follow-up care  Follow up with your healthcare provider or as advised.  When to seek medical advice  Call your healthcare provider right away if any of the following occur:    Stomach pain worsens or moves to the right lower part of the abdomen    Chest pain appears, or if it worsens or spreads to the chest, back, neck, shoulder, or arm    Frequent vomiting (can t keep down liquids)    Blood in the stool or vomit (red or black color)    Feeling weak or dizzy, fainting, or having trouble breathing    Fever of 100.4 F (38 C) or higher, or as directed by your healthcare provider    Abdominal swelling  Date Last Reviewed: 3/1/2018    [id number] [employer]. [address] [place] [address], [place] [address]. All rights reserved. This information is not intended as a substitute for professional medical care. Always follow your healthcare professional's instructions.

## 2019-04-17 ENCOUNTER — MYC MEDICAL ADVICE (OUTPATIENT)
Dept: PEDIATRICS | Facility: CLINIC | Age: 39
End: 2019-04-17

## 2019-04-17 DIAGNOSIS — J45.40 MODERATE PERSISTENT ASTHMA WITHOUT COMPLICATION: Primary | ICD-10-CM

## 2019-04-18 NOTE — TELEPHONE ENCOUNTER
Please review the [facility] message from pt & advise. LOV was with the SDP on 1/9/19. Last asthma f/u was on 9/25/18.    Nette WONG, RN  Triage Nurse

## 2019-04-19 NOTE — TELEPHONE ENCOUNTER
Ok with request. rx sent for low dose advair twice daily.   She could begin zatidor antihistamine drops OTC for eye symptoms.   In addition, I would take an antihistamine daily.   If her symptoms persist, she needs to return to clinic.   Shahrzad Huerta PA-C

## 2019-05-14 ENCOUNTER — OFFICE VISIT (OUTPATIENT)
Dept: PEDIATRICS | Facility: CLINIC | Age: 39
End: 2019-05-14
Payer: COMMERCIAL

## 2019-05-14 VITALS
SYSTOLIC BLOOD PRESSURE: 114 MMHG | TEMPERATURE: 98.4 F | HEIGHT: 64 IN | HEART RATE: 80 BPM | DIASTOLIC BLOOD PRESSURE: 64 MMHG | OXYGEN SATURATION: 97 % | BODY MASS INDEX: 33.87 KG/M2 | WEIGHT: 198.4 LBS

## 2019-05-14 DIAGNOSIS — M54.50 ACUTE BILATERAL LOW BACK PAIN WITHOUT SCIATICA: ICD-10-CM

## 2019-05-14 DIAGNOSIS — R39.9 SYMPTOMS INVOLVING URINARY SYSTEM: Primary | ICD-10-CM

## 2019-05-14 LAB
ALBUMIN UR-MCNC: 30 MG/DL
APPEARANCE UR: CLEAR
BACTERIA #/AREA URNS HPF: ABNORMAL /HPF
BILIRUB UR QL STRIP: ABNORMAL
COLOR UR AUTO: YELLOW
GLUCOSE UR STRIP-MCNC: NEGATIVE MG/DL
HGB UR QL STRIP: NEGATIVE
KETONES UR STRIP-MCNC: NEGATIVE MG/DL
LEUKOCYTE ESTERASE UR QL STRIP: NEGATIVE
MUCOUS THREADS #/AREA URNS LPF: PRESENT /LPF
NITRATE UR QL: NEGATIVE
NON-SQ EPI CELLS #/AREA URNS LPF: ABNORMAL /LPF
PH UR STRIP: 5.5 PH (ref 5–7)
RBC #/AREA URNS AUTO: ABNORMAL /HPF
SOURCE: ABNORMAL
SP GR UR STRIP: >1.03 (ref 1–1.03)
UROBILINOGEN UR STRIP-ACNC: 0.2 EU/DL (ref 0.2–1)
WBC #/AREA URNS AUTO: ABNORMAL /HPF

## 2019-05-14 PROCEDURE — 87086 URINE CULTURE/COLONY COUNT: CPT | Performed by: PHYSICIAN ASSISTANT

## 2019-05-14 PROCEDURE — 81001 URINALYSIS AUTO W/SCOPE: CPT | Performed by: PHYSICIAN ASSISTANT

## 2019-05-14 PROCEDURE — 99214 OFFICE O/P EST MOD 30 MIN: CPT | Performed by: PHYSICIAN ASSISTANT

## 2019-05-14 RX ORDER — SULFAMETHOXAZOLE/TRIMETHOPRIM 800-160 MG
1 TABLET ORAL 2 TIMES DAILY
Qty: 10 TABLET | Refills: 0 | Status: SHIPPED | OUTPATIENT
Start: 2019-05-14 | End: 2019-06-11

## 2019-05-14 ASSESSMENT — MIFFLIN-ST. JEOR: SCORE: 1569.94

## 2019-05-14 NOTE — PROGRESS NOTES
"  SUBJECTIVE:   Carley Hoyt is a 39 year old female who presents to clinic today for the following   health issues:    URINARY TRACT SYMPTOMS    Duration: 3 days     Description  frequency, hesitancy, retention and back pain    Intensity:  6/10    Accompanying signs and symptoms:  Fever/chills: no   Flank pain YES radiates  Nausea and vomiting: YES- nausea  Vaginal symptoms: none  Abdominal/Pelvic Pain: YES    History  History of frequent UTI's: YES  History of kidney stones: no   Sexually Active: YES  Possibility of pregnancy: No    Precipitating or alleviating factors: None    Therapies tried and outcome: cranberry juice, increase fluid intake, ibuprofen and Tylenol   Outcome: no progress     Sinus symptoms x this am.     ROS:  ROS otherwise negative    OBJECTIVE:                                                    /64 (BP Location: Right arm, Patient Position: Sitting, Cuff Size: Adult Large)   Pulse 80   Temp 98.4  F (36.9  C) (Tympanic)   Ht 1.626 m (5' 4\")   Wt 90 kg (198 lb 6.4 oz)   LMP 04/30/2019 (Approximate)   SpO2 97%   BMI 34.06 kg/m    Body mass index is 34.06 kg/m .   GENERAL: alert, no distress  RESP: lungs clear to auscultation - no rales, no rhonchi, no wheezes  CV: regular rates and rhythm, normal S1 S2, no S3 or S4 and no murmur, no click or rub  ABDOMEN: soft, suprapubic tenderness  BACK: no CVAT; paralumbar tenderness bilaterally; pain with lateral bending.     Diagnostic test results:  Results for orders placed or performed in visit on 05/14/19 (from the past 24 hour(s))   UA reflex to Microscopic and Culture   Result Value Ref Range    Color Urine Yellow     Appearance Urine Clear     Glucose Urine Negative NEG^Negative mg/dL    Bilirubin Urine Small (A) NEG^Negative    Ketones Urine Negative NEG^Negative mg/dL    Specific Gravity Urine >1.030 1.003 - 1.035    Blood Urine Negative NEG^Negative    pH Urine 5.5 5.0 - 7.0 pH    Protein Albumin Urine 30 (A) NEG^Negative mg/dL    " Urobilinogen Urine 0.2 0.2 - 1.0 EU/dL    Nitrite Urine Negative NEG^Negative    Leukocyte Esterase Urine Negative NEG^Negative    Source Midstream Urine    Urine Microscopic   Result Value Ref Range    WBC Urine 0 - 5 OTO5^0 - 5 /HPF    RBC Urine O - 2 OTO2^O - 2 /HPF    Squamous Epithelial /LPF Urine Few FEW^Few /LPF    Bacteria Urine Few (A) NEG^Negative /HPF    Mucous Urine Present (A) NEG^Negative /LPF        ASSESSMENT/PLAN:                                                    (R39.9) Symptoms involving urinary system  (primary encounter diagnosis)  Comment: with LBP. Begin antibiotics. UC pending.   Plan: UA reflex to Microscopic and Culture, Urine         Microscopic, Urine Culture Aerobic Bacterial,         sulfamethoxazole-trimethoprim (BACTRIM DS)         800-160 MG tablet          (M54.5) Acute bilateral low back pain without sciatica  Comment: begin ibuprofen and heat.  Plan:       Shahrzad Huerta PA-C  [facility]

## 2019-05-15 LAB
BACTERIA SPEC CULT: NO GROWTH
SPECIMEN SOURCE: NORMAL

## 2019-05-16 ENCOUNTER — MYC MEDICAL ADVICE (OUTPATIENT)
Dept: PEDIATRICS | Facility: CLINIC | Age: 39
End: 2019-05-16

## 2019-05-16 NOTE — TELEPHONE ENCOUNTER
Pt sent a [email or web address] message response to a lab note, from Kayla.  Please see [email or web address] message.    Please advise-    Hanh Bill RN - Triage  [facility]

## 2019-05-17 NOTE — TELEPHONE ENCOUNTER
Please have her finish antibiotics. Let us know if symptoms do no completely resolve.  Shahrzad Huerta PA-C

## 2019-06-06 ENCOUNTER — MYC MEDICAL ADVICE (OUTPATIENT)
Dept: PEDIATRICS | Facility: CLINIC | Age: 39
End: 2019-06-06

## 2019-06-06 DIAGNOSIS — F32.1 MODERATE MAJOR DEPRESSION (H): Primary | ICD-10-CM

## 2019-06-07 NOTE — TELEPHONE ENCOUNTER
The pt is aware and scheduled for their upcoming appointment.   Lamar Medina on 6/7/2019 at 4:01 PM

## 2019-06-07 NOTE — TELEPHONE ENCOUNTER
Please call the patient back I tried to get triage to speak to the patient but they were too busy

## 2019-06-07 NOTE — TELEPHONE ENCOUNTER
Fine to double book patient with me or use shortened appointment time if nothing available in next 2 weeks. I'd like to see her within 2 weeks. I've also placed a referral for therapy, patient will be contacted to schedule this. Will also route to [facility] to see if they can help reach out to patient.     Mary Kay Ospina MD  Internal Medicine-Pediatrics

## 2019-06-07 NOTE — TELEPHONE ENCOUNTER
Called patient, informed her of MD message and provided scheduling number for therapy as listed on referral. Patient transferred to scheduling for an appointment within two weeks.    Lana Amaya RN BSN   [facility]

## 2019-06-11 ENCOUNTER — TELEPHONE (OUTPATIENT)
Dept: PEDIATRICS | Facility: CLINIC | Age: 39
End: 2019-06-11

## 2019-06-11 ENCOUNTER — OFFICE VISIT (OUTPATIENT)
Dept: PEDIATRICS | Facility: CLINIC | Age: 39
End: 2019-06-11
Payer: COMMERCIAL

## 2019-06-11 VITALS
DIASTOLIC BLOOD PRESSURE: 68 MMHG | BODY MASS INDEX: 33.64 KG/M2 | WEIGHT: 196 LBS | OXYGEN SATURATION: 97 % | TEMPERATURE: 97.4 F | HEART RATE: 81 BPM | SYSTOLIC BLOOD PRESSURE: 104 MMHG

## 2019-06-11 DIAGNOSIS — F32.1 MODERATE MAJOR DEPRESSION (H): Primary | ICD-10-CM

## 2019-06-11 DIAGNOSIS — H10.13 ALLERGIC CONJUNCTIVITIS, BILATERAL: ICD-10-CM

## 2019-06-11 DIAGNOSIS — F41.9 ANXIETY: ICD-10-CM

## 2019-06-11 DIAGNOSIS — R10.13 ABDOMINAL PAIN, EPIGASTRIC: ICD-10-CM

## 2019-06-11 PROCEDURE — 82306 VITAMIN D 25 HYDROXY: CPT | Performed by: INTERNAL MEDICINE

## 2019-06-11 PROCEDURE — 99214 OFFICE O/P EST MOD 30 MIN: CPT | Performed by: INTERNAL MEDICINE

## 2019-06-11 PROCEDURE — 84443 ASSAY THYROID STIM HORMONE: CPT | Performed by: INTERNAL MEDICINE

## 2019-06-11 PROCEDURE — 36415 COLL VENOUS BLD VENIPUNCTURE: CPT | Performed by: INTERNAL MEDICINE

## 2019-06-11 RX ORDER — SERTRALINE HYDROCHLORIDE 100 MG/1
100 TABLET, FILM COATED ORAL DAILY
Qty: 90 TABLET | Refills: 1 | Status: SHIPPED | OUTPATIENT
Start: 2019-06-11 | End: 2019-11-11

## 2019-06-11 ASSESSMENT — PATIENT HEALTH QUESTIONNAIRE - PHQ9
SUM OF ALL RESPONSES TO PHQ QUESTIONS 1-9: 21
10. IF YOU CHECKED OFF ANY PROBLEMS, HOW DIFFICULT HAVE THESE PROBLEMS MADE IT FOR YOU TO DO YOUR WORK, TAKE CARE OF THINGS AT HOME, OR GET ALONG WITH OTHER PEOPLE: EXTREMELY DIFFICULT
SUM OF ALL RESPONSES TO PHQ QUESTIONS 1-9: 21

## 2019-06-11 NOTE — TELEPHONE ENCOUNTER
Phone Encounter   [facility] made attempt to reach patient, by PCP request. LM requesting a returned call and provided call back number.

## 2019-06-11 NOTE — PATIENT INSTRUCTIONS
1. Increase sertraline to 100mg daily.  2. Follow-up with Ashlie to talk about therapy.  3. We will check thyroid and vitamin D today.  4. Eye drops, ranitidine sent off.     Fine to update me through [employer] or [employer] in 1-3 months to see if we need to make any adjustments with your medication.

## 2019-06-11 NOTE — PROGRESS NOTES
Subjective     Carley Hoyt is a 39 year old female who presents to clinic today for the following health issues:       Depression and Anxiety Follow-Up    How are you doing with your depression since your last visit? Worsened     How are you doing with your anxiety since your last visit?  Worsened     Are you having other symptoms that might be associated with depression or anxiety? Yes:  panic attacks    Have you had a significant life event? Relationship Concerns, Job Concerns, Financial Concerns, Housing Concerns and Grief or Loss     Do you have any concerns with your use of alcohol or other drugs? No    Social History     Tobacco Use     Smoking status: Former Smoker     Smokeless tobacco: Never Used   Substance Use Topics     Alcohol use: Yes     Comment: ocassionally      Drug use: No     PHQ 9/25/2018 6/11/2019   PHQ-9 Total Score 10 21   Q9: Thoughts of better off dead/self-harm past 2 weeks Not at all Not at all     LIZBETH-7 SCORE 9/25/2018   Total Score 6     No flowsheet data found.    Carley comes in for evaluation of worsening depression symptoms. Over the past year, she moved to the area for her 's job, found a job and then lost it recently, and had a miscarriage. She is further away from her family and her support system. She feels like she is not present any more with her family, not helpful with her children, and sad. Has a hard time sleeping, but then struggles to get up in the morning. Has been dealing with some financial stressors related to losing her job. Denies any SI.    Of note, she was transitioned to sertraline from escitalopram when she became pregnant. Reports that escitalopram and bupropion that she was on used to help her mood, but wasn't helping much prior to her pregnancy. Thinks she has also been on citalopram (made her drowsy) and fluoxetine (briefly, not sure of the effect).     Has also noticed that she has had issues with diarrhea, fingernail peeling.      Patient Active  Problem List   Diagnosis     History of gestational diabetes     Hypothyroidism, unspecified type     Moderate persistent asthma, unspecified whether complicated     Seasonal allergic rhinitis     Gastroesophageal reflux disease, esophagitis presence not specified     Moderate major depression (H)     Anxiety     Obesity (BMI 35.0-39.9) with comorbidity (H)     Past Surgical History:   Procedure Laterality Date     APPENDECTOMY       CARPAL TUNNEL RELEASE RT/LT Right 2016      SECTION       wisdom teeth         Social History     Tobacco Use     Smoking status: Former Smoker     Smokeless tobacco: Never Used   Substance Use Topics     Alcohol use: Yes     Comment: ocassionally      Family History   Problem Relation Age of Onset     Diabetes Father      Glaucoma Father      Diabetes Maternal Grandmother      Asthma Mother      Breast Cancer Mother 60             Reviewed and updated as needed this visit by Provider  Meds         Review of Systems   ROS COMP: Constitutional, endo, GI, derm, psych systems are negative, except as otherwise noted.      Objective    /68 (BP Location: Right arm, Patient Position: Sitting, Cuff Size: Adult Regular)   Pulse 81   Temp 97.4  F (36.3  C) (Tympanic)   Wt 88.9 kg (196 lb)   SpO2 97%   BMI 33.64 kg/m    Body mass index is 33.64 kg/m .  Physical Exam   GENERAL: healthy, alert and no distress  SKIN: splitting of distal fingernails.   PSYCH: mentation appears normal, affect slightly sad          Assessment & Plan     1. Moderate major depression (H)  Recently worsened, likely due to increased life stressors. Discussed going back to escitalopram vs increasing sertraline vs trying new medication. Patient would like to try sertraline increase; reviewed SEs. Will also check TSH, vit D levels. [facility] has reached out to patient, patient will call back to arrange therapy.   - TSH with free T4 reflex  - Vitamin D Deficiency  - sertraline (ZOLOFT) 100 MG  tablet; Take 1 tablet (100 mg) by mouth daily  Dispense: 90 tablet; Refill: 1    2. Anxiety  As above.   - TSH with free T4 reflex  - Vitamin D Deficiency  - sertraline (ZOLOFT) 100 MG tablet; Take 1 tablet (100 mg) by mouth daily  Dispense: 90 tablet; Refill: 1    3. Abdominal pain, epigastric  Thought to be secondary to GERD, medication refilled.   - ranitidine (ZANTAC) 150 MG tablet; Take 1 tablet (150 mg) by mouth 2 times daily  Dispense: 180 tablet; Refill: 3    4. Allergic conjunctivitis, bilateral  Patient requesting prescription for OTC drops.   - ketotifen (ZADITOR/REFRESH ANTI-ITCH) 0.025 % ophthalmic solution; Place 1 drop into both eyes 2 times daily  Dispense: 5 mL; Refill: 3         Patient Instructions   1. Increase sertraline to 100mg daily.  2. Follow-up with Ashlie to talk about therapy.  3. We will check thyroid and vitamin D today.  4. Eye drops, ranitidine sent off.     Fine to update me through [employer] or [employer] in 1-3 months to see if we need to make any adjustments with your medication.       Return in about 3 months (around 9/11/2019).    Mary Kay Patino MD  [facility]      Answers for HPI/ROS submitted by the patient on 6/11/2019   Chronic problems general questions HPI Form  If you checked off any problems, how difficult have these problems made it for you to do your work, take care of things at home, or get along with other people?: Extremely difficult  PHQ9 TOTAL SCORE: 21

## 2019-06-12 LAB
DEPRECATED CALCIDIOL+CALCIFEROL SERPL-MC: 30 UG/L (ref 20–75)
TSH SERPL DL<=0.005 MIU/L-ACNC: 3.89 MU/L (ref 0.4–4)

## 2019-06-12 ASSESSMENT — PATIENT HEALTH QUESTIONNAIRE - PHQ9: SUM OF ALL RESPONSES TO PHQ QUESTIONS 1-9: 21

## 2019-06-13 NOTE — TELEPHONE ENCOUNTER
Phone Encounter   [facility] made 2nd attempt to reach patient to discuss [facility] role and help assist in scheduling an appointment. Informed of availability for today to return call.

## 2019-06-17 ENCOUNTER — MYC MEDICAL ADVICE (OUTPATIENT)
Dept: PEDIATRICS | Facility: CLINIC | Age: 39
End: 2019-06-17

## 2019-06-17 NOTE — TELEPHONE ENCOUNTER
Phone Encounter   Patient returned call to [facility]. Introduced self and role and discussed counseling versus meeting with a [facility]. Patient states that she had previously done counseling in [place], however it was intermittent and meeting with the [facility] wasn't very helpful as she didn't feel it was a good fit. Discussed scheduling with [facility] and she felt scheduling with this writer would be a great start. Patient scheduled an initial visit with this writer for 6/20/19.

## 2019-06-18 NOTE — TELEPHONE ENCOUNTER
Phone Encounter   TYRONE spoke with patient and assisted her in rescheduling her visit with this writer for 6/20/19 at 11:15.

## 2019-06-20 ENCOUNTER — OFFICE VISIT (OUTPATIENT)
Dept: PEDIATRICS | Facility: CLINIC | Age: 39
End: 2019-06-20
Payer: COMMERCIAL

## 2019-06-20 DIAGNOSIS — F32.1 MODERATE MAJOR DEPRESSION (H): Primary | ICD-10-CM

## 2019-06-20 DIAGNOSIS — F41.1 GAD (GENERALIZED ANXIETY DISORDER): ICD-10-CM

## 2019-06-20 PROCEDURE — 90791 PSYCH DIAGNOSTIC EVALUATION: CPT | Performed by: MARRIAGE & FAMILY THERAPIST

## 2019-06-20 ASSESSMENT — ANXIETY QUESTIONNAIRES
4. TROUBLE RELAXING: NEARLY EVERY DAY
GAD7 TOTAL SCORE: 16
7. FEELING AFRAID AS IF SOMETHING AWFUL MIGHT HAPPEN: SEVERAL DAYS
3. WORRYING TOO MUCH ABOUT DIFFERENT THINGS: NEARLY EVERY DAY
7. FEELING AFRAID AS IF SOMETHING AWFUL MIGHT HAPPEN: SEVERAL DAYS
1. FEELING NERVOUS, ANXIOUS, OR ON EDGE: NEARLY EVERY DAY
GAD7 TOTAL SCORE: 16
6. BECOMING EASILY ANNOYED OR IRRITABLE: MORE THAN HALF THE DAYS
2. NOT BEING ABLE TO STOP OR CONTROL WORRYING: NEARLY EVERY DAY
5. BEING SO RESTLESS THAT IT IS HARD TO SIT STILL: SEVERAL DAYS

## 2019-06-20 NOTE — Clinical Note
Derrek Ospina, Here is my DA for Carley. I am not changing her diagnosis, other than changing her anxiety in her problem list to say Generalized Anxiety Disorder. She will likely transition to a therapist she can see more long-term. Let me know if there is anything else that you need.Thanks,Ashlie
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
## 2019-06-21 ASSESSMENT — ANXIETY QUESTIONNAIRES: GAD7 TOTAL SCORE: 16

## 2019-07-15 ENCOUNTER — MYC MEDICAL ADVICE (OUTPATIENT)
Dept: PEDIATRICS | Facility: CLINIC | Age: 39
End: 2019-07-15

## 2019-11-11 ENCOUNTER — MYC MEDICAL ADVICE (OUTPATIENT)
Dept: PEDIATRICS | Facility: CLINIC | Age: 39
End: 2019-11-11

## 2019-11-11 DIAGNOSIS — F32.1 MODERATE MAJOR DEPRESSION (H): ICD-10-CM

## 2019-11-11 DIAGNOSIS — J30.2 CHRONIC SEASONAL ALLERGIC RHINITIS: ICD-10-CM

## 2019-11-11 DIAGNOSIS — J45.40 MODERATE PERSISTENT ASTHMA, UNSPECIFIED WHETHER COMPLICATED: ICD-10-CM

## 2019-11-11 DIAGNOSIS — F41.9 ANXIETY: ICD-10-CM

## 2019-11-11 DIAGNOSIS — J45.40 MODERATE PERSISTENT ASTHMA WITHOUT COMPLICATION: ICD-10-CM

## 2019-11-11 RX ORDER — MONTELUKAST SODIUM 10 MG/1
1 TABLET ORAL AT BEDTIME
Qty: 90 TABLET | Refills: 1 | Status: SHIPPED | OUTPATIENT
Start: 2019-11-11 | End: 2020-04-20

## 2019-11-11 RX ORDER — SERTRALINE HYDROCHLORIDE 100 MG/1
100 TABLET, FILM COATED ORAL DAILY
Qty: 90 TABLET | Refills: 0 | Status: SHIPPED | OUTPATIENT
Start: 2019-11-11 | End: 2020-04-20

## 2019-11-13 NOTE — TELEPHONE ENCOUNTER
Let's have her follow-up with Marybeth if she is willing to come into clinic for a visit. She may not want to if she is having insurance difficulties. If not, can we have her do an Evisit, or at least PHQ-9 to see if sertraline increase is helping her symptoms?     Thanks!    Mary Kay Ospina MD  Internal Medicine-Pediatrics

## 2019-11-14 ENCOUNTER — TELEPHONE (OUTPATIENT)
Dept: BEHAVIORAL HEALTH | Facility: CLINIC | Age: 39
End: 2019-11-14

## 2019-11-14 NOTE — TELEPHONE ENCOUNTER
Phone Encounter   [facility] attempted to reach patient, by PCP request. LM requesting a returned call and provided call back number.    Marybeth Escobar, GENNA, [facility]

## 2020-02-05 ENCOUNTER — MYC MEDICAL ADVICE (OUTPATIENT)
Dept: PEDIATRICS | Facility: CLINIC | Age: 40
End: 2020-02-05

## 2020-02-05 DIAGNOSIS — Z30.011 ENCOUNTER FOR INITIAL PRESCRIPTION OF CONTRACEPTIVE PILLS: Primary | ICD-10-CM

## 2020-02-06 RX ORDER — LEVONORGESTREL/ETHIN.ESTRADIOL 0.1-0.02MG
1 TABLET ORAL DAILY
Qty: 84 TABLET | Refills: 4 | Status: SHIPPED | OUTPATIENT
Start: 2020-02-06

## 2020-02-18 ENCOUNTER — MYC MEDICAL ADVICE (OUTPATIENT)
Dept: PEDIATRICS | Facility: CLINIC | Age: 40
End: 2020-02-18

## 2020-02-18 ENCOUNTER — E-VISIT (OUTPATIENT)
Dept: PEDIATRICS | Facility: CLINIC | Age: 40
End: 2020-02-18
Payer: COMMERCIAL

## 2020-02-18 DIAGNOSIS — J11.1 INFLUENZA-LIKE ILLNESS: Primary | ICD-10-CM

## 2020-02-18 PROCEDURE — 99421 OL DIG E/M SVC 5-10 MIN: CPT | Performed by: INTERNAL MEDICINE

## 2020-02-18 RX ORDER — OSELTAMIVIR PHOSPHATE 75 MG/1
75 CAPSULE ORAL 2 TIMES DAILY
Qty: 10 CAPSULE | Refills: 0 | Status: SHIPPED | OUTPATIENT
Start: 2020-02-18 | End: 2020-02-23

## 2020-02-18 NOTE — TELEPHONE ENCOUNTER
Provider E-Visit time total (minutes): 5 min.    Mary Kay Ospina MD  Internal Medicine-Pediatrics

## 2020-02-18 NOTE — LETTER
February 19, 2020      Carley Hoyt  [address]   [place] [address]        To Whom It May Concern:    Carley Hoyt is under my medical care. Due to illness, she has been unable to attend work from February 17-20, 2020. If she is recovered on February 21, 2020, she may go back to work at that time.         Sincerely,        Mary Kay Ospina MD

## 2020-03-11 ENCOUNTER — HEALTH MAINTENANCE LETTER (OUTPATIENT)
Age: 40
End: 2020-03-11

## 2020-04-09 ENCOUNTER — TELEPHONE (OUTPATIENT)
Dept: PEDIATRICS | Facility: CLINIC | Age: 40
End: 2020-04-09

## 2020-04-09 ENCOUNTER — MYC MEDICAL ADVICE (OUTPATIENT)
Dept: PEDIATRICS | Facility: CLINIC | Age: 40
End: 2020-04-09

## 2020-04-16 ENCOUNTER — MYC MEDICAL ADVICE (OUTPATIENT)
Dept: PEDIATRICS | Facility: CLINIC | Age: 40
End: 2020-04-16

## 2020-04-20 ENCOUNTER — VIRTUAL VISIT (OUTPATIENT)
Dept: PEDIATRICS | Facility: CLINIC | Age: 40
End: 2020-04-20
Payer: COMMERCIAL

## 2020-04-20 DIAGNOSIS — J45.40 MODERATE PERSISTENT ASTHMA, UNSPECIFIED WHETHER COMPLICATED: ICD-10-CM

## 2020-04-20 DIAGNOSIS — F32.1 MODERATE MAJOR DEPRESSION (H): ICD-10-CM

## 2020-04-20 DIAGNOSIS — F41.9 ANXIETY: ICD-10-CM

## 2020-04-20 DIAGNOSIS — E03.9 HYPOTHYROIDISM, UNSPECIFIED TYPE: ICD-10-CM

## 2020-04-20 DIAGNOSIS — J30.2 CHRONIC SEASONAL ALLERGIC RHINITIS: ICD-10-CM

## 2020-04-20 DIAGNOSIS — H10.13 ALLERGIC CONJUNCTIVITIS, BILATERAL: ICD-10-CM

## 2020-04-20 DIAGNOSIS — R22.31 NODULE OF FINGER, RIGHT: Primary | ICD-10-CM

## 2020-04-20 PROCEDURE — 99214 OFFICE O/P EST MOD 30 MIN: CPT | Mod: TEL | Performed by: INTERNAL MEDICINE

## 2020-04-20 RX ORDER — MONTELUKAST SODIUM 10 MG/1
1 TABLET ORAL AT BEDTIME
Qty: 90 TABLET | Refills: 1 | Status: SHIPPED | OUTPATIENT
Start: 2020-04-20

## 2020-04-20 RX ORDER — LEVOTHYROXINE SODIUM 50 UG/1
TABLET ORAL
Qty: 120 TABLET | Refills: 0 | Status: SHIPPED | OUTPATIENT
Start: 2020-04-20

## 2020-04-20 RX ORDER — ALBUTEROL SULFATE 90 UG/1
2 AEROSOL, METERED RESPIRATORY (INHALATION) EVERY 6 HOURS PRN
Qty: 8.5 G | Refills: 11 | Status: SHIPPED | OUTPATIENT
Start: 2020-04-20

## 2020-04-20 RX ORDER — SERTRALINE HYDROCHLORIDE 100 MG/1
150 TABLET, FILM COATED ORAL DAILY
Qty: 135 TABLET | Refills: 3 | Status: SHIPPED | OUTPATIENT
Start: 2020-04-20

## 2020-04-20 ASSESSMENT — ANXIETY QUESTIONNAIRES
1. FEELING NERVOUS, ANXIOUS, OR ON EDGE: SEVERAL DAYS
4. TROUBLE RELAXING: SEVERAL DAYS
GAD7 TOTAL SCORE: 5
5. BEING SO RESTLESS THAT IT IS HARD TO SIT STILL: NOT AT ALL
GAD7 TOTAL SCORE: 5
7. FEELING AFRAID AS IF SOMETHING AWFUL MIGHT HAPPEN: NOT AT ALL
2. NOT BEING ABLE TO STOP OR CONTROL WORRYING: SEVERAL DAYS
3. WORRYING TOO MUCH ABOUT DIFFERENT THINGS: SEVERAL DAYS
6. BECOMING EASILY ANNOYED OR IRRITABLE: SEVERAL DAYS
7. FEELING AFRAID AS IF SOMETHING AWFUL MIGHT HAPPEN: NOT AT ALL
GAD7 TOTAL SCORE: 5

## 2020-04-20 ASSESSMENT — PATIENT HEALTH QUESTIONNAIRE - PHQ9
SUM OF ALL RESPONSES TO PHQ QUESTIONS 1-9: 12
SUM OF ALL RESPONSES TO PHQ QUESTIONS 1-9: 12
10. IF YOU CHECKED OFF ANY PROBLEMS, HOW DIFFICULT HAVE THESE PROBLEMS MADE IT FOR YOU TO DO YOUR WORK, TAKE CARE OF THINGS AT HOME, OR GET ALONG WITH OTHER PEOPLE: SOMEWHAT DIFFICULT

## 2020-04-20 NOTE — PROGRESS NOTES
"Carley Hoyt is a 40 year old female who is being evaluated via a billable telephone visit.      The patient has been notified of following:     \"This telephone visit will be conducted via a call between you and your physician/provider. We have found that certain health care needs can be provided without the need for a physical exam.  This service lets us provide the care you need with a short phone conversation.  If a prescription is necessary we can send it directly to your pharmacy.  If lab work is needed we can place an order for that and you can then stop by our lab to have the test done at a later time.    Telephone visits are billed at different rates depending on your insurance coverage. During this emergency period, for some insurers they may be billed the same as an in-person visit.  Please reach out to your insurance provider with any questions.    If during the course of the call the physician/provider feels a telephone visit is not appropriate, you will not be charged for this service.\"    Patient has given verbal consent for Telephone visit?  Yes    How would you like to obtain your AVS? MyChart    Subjective     Carley Hoyt is a 40 year old female who presents to clinic today for the following health issues:    Carley calls in for follow-up of her medications.    Of note, her family is moving to [place] in 6 weeks. Wants to make sure that her medications are filled and up to date before she moves.     She has a small bump the size of a BB pellet in her index finger. Mildly tender, about 1/4 of an inch above the PIP. Can squish it and make it go away. On R palmar surface.     Was told that Wixela inhaler no longer covered by her insurance. Only Advair covered, but it is still $200 per inhaler. Not sure if she wants to fill this or not right now.    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     Patient " Active Problem List   Diagnosis     History of gestational diabetes     Hypothyroidism, unspecified type     Moderate persistent asthma, unspecified whether complicated     Seasonal allergic rhinitis     Gastroesophageal reflux disease, esophagitis presence not specified     Moderate major depression (H)     Anxiety     Obesity (BMI 35.0-39.9) with comorbidity (H)     Past Surgical History:   Procedure Laterality Date     APPENDECTOMY       CARPAL TUNNEL RELEASE RT/LT Right 2016      SECTION       wisdom teeth         Social History     Tobacco Use     Smoking status: Former Smoker     Smokeless tobacco: Never Used   Substance Use Topics     Alcohol use: Yes     Comment: ocassionally      Family History   Problem Relation Age of Onset     Diabetes Father      Glaucoma Father      Diabetes Maternal Grandmother      Asthma Mother      Breast Cancer Mother 60           Reviewed and updated as needed this visit by Provider  Meds         Review of Systems   ROS COMP: Constitutional, HEENT, cardiovascular, pulmonary, gi, endo, psych systems are negative, except as otherwise noted.       Objective   Reported vitals:  There were no vitals taken for this visit.   PSYCH: Alert and oriented times 3; coherent speech, normal   rate and volume, able to articulate logical thoughts, able   to abstract reason, no tangential thoughts, no hallucinations   or delusions  Her affect is normal  RESP: No cough, no audible wheezing, able to talk in full sentences  Remainder of exam unable to be completed due to telephone visits    Diagnostic Test Results:  Labs reviewed in Epic        Assessment/Plan:    ICD-10-CM    1. Nodule of finger, right  R22.31    2. Moderate persistent asthma, unspecified whether complicated  J45.40 albuterol (PROAIR HFA/PROVENTIL HFA/VENTOLIN HFA) 108 (90 Base) MCG/ACT inhaler     montelukast (SINGULAIR) 10 MG tablet   3. Moderate major depression (H)  F32.1 sertraline (ZOLOFT) 100 MG tablet   4.  Anxiety  F41.9 sertraline (ZOLOFT) 100 MG tablet   5. Allergic conjunctivitis, bilateral  H10.13    6. Hypothyroidism, unspecified type  E03.9 **TSH with free T4 reflex FUTURE anytime     levothyroxine (SYNTHROID/LEVOTHROID) 50 MCG tablet   7. Chronic seasonal allergic rhinitis  J30.2 montelukast (SINGULAIR) 10 MG tablet     Nodule: Likely ganglion cyst vs Dupytren's contracture. Non-urgent, will follow-up in [place] with specialist once she establishes there.  Asthma: Encouraged her to consider filling Advair, she will let us know if she wants this sent  Depression/anxiety: Recently worsened with multiple stressors. Will increase sertraline to 150mg for now. Follow-up when she establishes care in [place].  Hypothyroidism: Due for labs; medications refilled pending these.     No follow-ups on file.      Phone call duration:  14 minutes    Mary Kay Ospina MD  Internal Medicine-Pediatrics        Answers for HPI/ROS submitted by the patient on 4/20/2020   Chronic problems general questions HPI Form  How many servings of fruits and vegetables do you eat daily?: 0-1  On average, how many sweetened beverages do you drink each day (Examples: soda, juice, sweet tea, etc.  Do NOT count diet or artificially sweetened beverages)?: 0  How many minutes a day do you exercise enough to make your heart beat faster?: 9 or less  How many days a week do you exercise enough to make your heart beat faster?: 3 or less  How many days per week do you miss taking your medication?: 1  What makes it hard for you to take your medication every day?: cost of medication, remembering to take  Do you have a cough?: No  Are you experiencing any wheezing in your chest?: No  Do you have any shortness of breath?: No  Use of rescue inhaler:: a few times a month  Taking Asthma medication as prescribed:: 2  Asthma triggers:: animal dander, cold air, dust mites, exercise or sports, humidity, mold, pollens, strong odors and fumes  Have you had any Emergency Room  visits, Urgent Care visits, or Hospital Admissions since your last office visit?: No  Depression/Anxiety: Depression & Anxiety  Status since last visit:: medium  Anxiety since last: : medium  Other associated symptoms of depression:: Yes  Other associated symotome: : No  Significant life event: : job concerns, housing concerns, other  Anxious:: No  Current substance use:: No  If you checked off any problems, how difficult have these problems made it for you to do your work, take care of things at home, or get along with other people?: Somewhat difficult  PHQ9 TOTAL SCORE: 12  LIZBETH 7 TOTAL SCORE: 5  Since last visit, patient describes the following symptoms:: Anxiety, Constipation, Depression, Fatigue, Loose stools

## 2020-04-21 ASSESSMENT — PATIENT HEALTH QUESTIONNAIRE - PHQ9: SUM OF ALL RESPONSES TO PHQ QUESTIONS 1-9: 12

## 2020-04-21 ASSESSMENT — ANXIETY QUESTIONNAIRES: GAD7 TOTAL SCORE: 5

## 2020-05-02 ASSESSMENT — ASTHMA QUESTIONNAIRES: ACT_TOTALSCORE: 22

## 2021-01-03 ENCOUNTER — HEALTH MAINTENANCE LETTER (OUTPATIENT)
Age: 41
End: 2021-01-03

## 2021-03-02 ENCOUNTER — TELEPHONE (OUTPATIENT)
Dept: PEDIATRICS | Facility: CLINIC | Age: 41
End: 2021-03-02

## 2021-03-02 NOTE — TELEPHONE ENCOUNTER
Patient Quality Outreach      Summary:    Patient has the following on her problem list/HM:     Depression / Dysthymia review    6 Month Remission: 4-8 month window range:   12 Month Remission: 10-14 month window range:        PHQ-9 SCORE 6/11/2019 11/14/2019 4/20/2020   PHQ-9 Total Score MyChart 21 (Severe depression) 11 (Moderate depression) 12 (Moderate depression)   PHQ-9 Total Score 21 11 12       If PHQ-9 recheck is 5 or more, route to provider for next steps.    Patient is due/failing the following:   None    Type of outreach:    patient moved to [place] and was seen 1/22/21    Questions for provider review:    None                                                                                                                                     Leida Quiroz LPN       Chart routed to none.

## 2021-04-25 ENCOUNTER — HEALTH MAINTENANCE LETTER (OUTPATIENT)
Age: 41
End: 2021-04-25

## 2021-07-29 ENCOUNTER — TELEPHONE (OUTPATIENT)
Dept: PEDIATRICS | Facility: CLINIC | Age: 41
End: 2021-07-29

## 2021-07-29 NOTE — LETTER
My Depression Action Plan  Name: Carley Hoyt   Date of Birth 1980  Date: 7/29/2021    My doctor: Mary Kay Ospina   My clinic: [facility]  [address] [place] [address]  [address]  [facility] [address] [phone]          GREEN    ZONE   Good Control    What it looks like:     Things are going generally well. You have normal ups and downs. You may even feel depressed from time to time, but bad moods usually last less than a day.   What you need to do:  1. Continue to care for yourself (see self care plan)  2. Check your depression survival kit and update it as needed  3. Follow your physician s recommendations including any medication.  4. Do not stop taking medication unless you consult with your physician first.           YELLOW         ZONE Getting Worse    What it looks like:     Depression is starting to interfere with your life.     It may be hard to get out of bed; you may be starting to isolate yourself from others.    Symptoms of depression are starting to last most all day and this has happened for several days.     You may have suicidal thoughts but they are not constant.   What you need to do:     1. Call your care team. Your response to treatment will improve if you keep your care team informed of your progress. Yellow periods are signs an adjustment may need to be made.     2. Continue your self-care.  Just get dressed and ready for the day.  Don't give yourself time to talk yourself out of it.    3. Talk to someone in your support network.    4. Open up your Depression Self-Care Plan/Wellness Kit.           RED    ZONE Medical Alert - Get Help    What it looks like:     Depression is seriously interfering with your life.     You may experience these or other symptoms: You can t get out of bed most days, can t work or engage in other necessary activities, you have trouble taking care of basic hygiene, or basic responsibilities, thoughts of suicide or death  that will not go away, self-injurious behavior.     What you need to do:  1. Call your care team and request a same-day appointment. If they are not available (weekends or after hours) call your local crisis line, emergency room or 911.          Depression Self-Care Plan / Wellness Kit    Many people find that medication and therapy are helpful treatments for managing depression. In addition, making small changes to your everyday life can help to boost your mood and improve your wellbeing. Below are some tips for you to consider. Be sure to talk with your medical provider and/or behavioral health consultant if your symptoms are worsening or not improving.     Sleep   Sleep hygiene  means all of the habits that support good, restful sleep. It includes maintaining a consistent bedtime and wake time, using your bedroom only for sleeping or sex, and keeping the bedroom dark and free of distractions like a computer, smartphone, or television.     Develop a Healthy Routine  Maintain good hygiene. Get out of bed in the morning, make your bed, brush your teeth, take a shower, and get dressed. Don t spend too much time viewing media that makes you feel stressed. Find time to relax each day.    Exercise  Get some form of exercise every day. This will help reduce pain and release endorphins, the  feel good  chemicals in your brain. It can be as simple as just going for a walk or doing some gardening, anything that will get you moving.      Diet  Strive to eat healthy foods, including fruits and vegetables. Drink plenty of water. Avoid excessive sugar, caffeine, alcohol, and other mood-altering substances.     Stay Connected with Others  Stay in touch with friends and family members.    Manage Your Mood  Try deep breathing, massage therapy, biofeedback, or meditation. Take part in fun activities when you can. Try to find something to smile about each day.     Psychotherapy  Be open to working with a therapist if your provider  recommends it.     Medication  Be sure to take your medication as prescribed. Most anti-depressants need to be taken every day. It usually takes several weeks for medications to work. Not all medicines work for all people. It is important to follow-up with your provider to make sure you have a treatment plan that is working for you. Do not stop your medication abruptly without first discussing it with your provider.    Crisis Resources   These hotlines are for both adults and children. They and are open 24 hours a day, 7 days a week unless noted otherwise.      National Suicide Prevention Lifeline   1-504-476-IJOE (7680)      Crisis Text Line    www.crisistextline.org  Text HOME to 974270 from anywhere in the [place], anytime, about any type of crisis. A live, trained crisis counselor will receive the text and respond quickly.      [employer] Lifeline for LGBTQ Youth  A national crisis intervention and suicide lifeline for LGBTQ youth under 25. Provides a safe place to talk without judgement. Call 1-261.685.1255; text START to 273006 or visit www.thetrevorproject.org to talk to a trained counselor.      For [facility] crisis numbers, visit the [facility] website at:  [place]://[place].gov/dhs/people-we-serve/adults/health-care/mental-health/resources/crisis-contacts.jsp

## 2021-07-29 NOTE — TELEPHONE ENCOUNTER
Patient Quality Outreach      Summary:    Patient has the following on her problem list/HM:     Depression / Dysthymia review    6 Month Remission: 4-8 month window range:   12 Month Remission: 10-14 month window range:        PHQ-9 SCORE 6/11/2019 11/14/2019 4/20/2020   PHQ-9 Total Score MyChart 21 (Severe depression) 11 (Moderate depression) 12 (Moderate depression)   PHQ-9 Total Score 21 11 12       If PHQ-9 recheck is 5 or more, route to provider for next steps.    Patient is due/failing the following:   PHQ-9 Needed and DAP    Type of outreach:    Sent Strev[employer] message. and DAP completed    Questions for provider review:    None                                                                                                                                     Leida Quiroz LPN       Chart routed to Care Team.

## 2021-10-10 ENCOUNTER — HEALTH MAINTENANCE LETTER (OUTPATIENT)
Age: 41
End: 2021-10-10

## 2021-10-19 PROBLEM — F32.9 MAJOR DEPRESSION: Status: ACTIVE | Noted: 2018-09-25

## 2022-02-17 PROBLEM — K21.9 GASTROESOPHAGEAL REFLUX DISEASE: Status: ACTIVE | Noted: 2018-09-25

## 2022-05-21 ENCOUNTER — HEALTH MAINTENANCE LETTER (OUTPATIENT)
Age: 42
End: 2022-05-21

## 2022-09-18 ENCOUNTER — HEALTH MAINTENANCE LETTER (OUTPATIENT)
Age: 42
End: 2022-09-18

## 2023-12-17 ENCOUNTER — HEALTH MAINTENANCE LETTER (OUTPATIENT)
Age: 43
End: 2023-12-17